# Patient Record
Sex: MALE | Race: WHITE | Employment: FULL TIME | URBAN - METROPOLITAN AREA
[De-identification: names, ages, dates, MRNs, and addresses within clinical notes are randomized per-mention and may not be internally consistent; named-entity substitution may affect disease eponyms.]

---

## 2017-10-13 ENCOUNTER — ALLIED HEALTH/NURSE VISIT (OUTPATIENT)
Dept: FAMILY MEDICINE | Facility: CLINIC | Age: 42
End: 2017-10-13
Payer: COMMERCIAL

## 2017-10-13 DIAGNOSIS — Z23 NEED FOR PROPHYLACTIC VACCINATION AND INOCULATION AGAINST INFLUENZA: Primary | ICD-10-CM

## 2017-10-13 PROCEDURE — 90471 IMMUNIZATION ADMIN: CPT

## 2017-10-13 PROCEDURE — 90686 IIV4 VACC NO PRSV 0.5 ML IM: CPT

## 2017-10-13 PROCEDURE — 99207 ZZC NO CHARGE NURSE ONLY: CPT

## 2017-10-13 NOTE — MR AVS SNAPSHOT
"              After Visit Summary   10/13/2017    Torey Mata    MRN: [id number]           Patient Information     Date Of Birth          1975        Visit Information        Provider Department      10/13/2017 3:45 PM FL YOLANDA MERCADO/LPN [facility]        Today's Diagnoses     Need for prophylactic vaccination and inoculation against influenza    -  1       Follow-ups after your visit        Who to contact     If you have questions or need follow up information about today's clinic visit or your schedule please contact [facility] directly at [phone].  Normal or non-critical lab and imaging results will be communicated to you by Sungy Mobilehart, letter or phone within 4 business days after the clinic has received the results. If you do not hear from us within 7 days, please contact the clinic through BeThereRewardst or phone. If you have a critical or abnormal lab result, we will notify you by phone as soon as possible.  Submit refill requests through [email or web address] or call your pharmacy and they will forward the refill request to us. Please allow 3 business days for your refill to be completed.          Additional Information About Your Visit        MyChart Information     [email or web address] lets you send messages to your doctor, view your test results, renew your prescriptions, schedule appointments and more. To sign up, go to [email or web address] . Click on \"Log in\" on the left side of the screen, which will take you to the Welcome page. Then click on \"Sign up Now\" on the right side of the page.     You will be asked to enter the access code listed below, as well as some personal information. Please follow the directions to create your username and password.     Your access code is: [email or web address]  Expires: 2018  4:00 PM     Your access code will  in 90 days. If you need help or a new code, please call your [facility] or [phone].        Care EveryWhere ID     This is your Care " EveryWhere ID. This could be used by other organizations to access your [place] medical records  [id number]         Blood Pressure from Last 3 Encounters:   03/03/16 130/88   01/15/16 118/71   06/08/15 110/77    Weight from Last 3 Encounters:   01/15/16 218 lb (98.9 kg)   06/08/15 214 lb (97.1 kg)   08/16/11 216 lb (98 kg)              We Performed the Following     FLU VAC, SPLIT VIRUS IM > 3 YO (QUADRIVALENT) [41996]     Vaccine Administration, Initial [85470]     Vaccine Administration, Initial [04486]        Primary Care Provider Office Phone # Fax #    Jerry Dave Carr [phone] [address] [address]        Equal Access to Services     JOLANTA JEONG : [place], waaxda luqadaha, qaybta kaalmada nicole, guillermo dodson . So [facility] [phone].    ATENCIÓN: Si habla español, tiene a xiao disposición servicios gratuitos de asistencia lingüística. Farihaame al [phone].    We comply with applicable federal civil rights laws and [place] laws. We do not discriminate on the basis of race, color, national origin, age, disability, sex, sexual orientation, or gender identity.            Thank you!     Thank you for choosing [facility]  for your care. Our goal is always to provide you with excellent care. Hearing back from our patients is one way we can continue to improve our services. Please take a few minutes to complete the written survey that you may receive in the mail after your visit with us. Thank you!             Your Updated Medication List - Protect others around you: Learn how to safely use, store and throw away your medicines at www.disposemymeds.org.          This list is accurate as of: 10/13/17  4:00 PM.  Always use your most recent med list.                   Brand Name Dispense Instructions for use Diagnosis    TYLENOL 500 MG tablet   Generic drug:  acetaminophen      Take 1-2 tablets by mouth  every 6 hours as needed.

## 2017-10-13 NOTE — PROGRESS NOTES
Injectable Influenza Immunization Documentation    1.  Is the person to be vaccinated sick today?   No    2. Does the person to be vaccinated have an allergy to a component   of the vaccine?   No    3. Has the person to be vaccinated ever had a serious reaction   to influenza vaccine in the past?   No    4. Has the person to be vaccinated ever had Guillain-Barré syndrome?   No    Form completed by Fariha Carrasquillo CMA  Prior to injection verified patient identity using patient's name and date of birth.  Per orders of  , injection of flu given by Fariha Carrasquillo. Patient instructed to remain in clinic for 15 minutes afterwards, and to report any adverse reaction to me immediately.

## 2018-09-17 ENCOUNTER — OFFICE VISIT (OUTPATIENT)
Dept: FAMILY MEDICINE | Facility: CLINIC | Age: 43
End: 2018-09-17
Payer: COMMERCIAL

## 2018-09-17 VITALS
TEMPERATURE: 97.6 F | BODY MASS INDEX: 29.03 KG/M2 | WEIGHT: 219 LBS | HEIGHT: 73 IN | HEART RATE: 69 BPM | SYSTOLIC BLOOD PRESSURE: 120 MMHG | DIASTOLIC BLOOD PRESSURE: 82 MMHG | OXYGEN SATURATION: 97 %

## 2018-09-17 DIAGNOSIS — Z23 NEED FOR PROPHYLACTIC VACCINATION AND INOCULATION AGAINST INFLUENZA: Primary | ICD-10-CM

## 2018-09-17 DIAGNOSIS — M51.26 LUMBAR DISC HERNIATION: ICD-10-CM

## 2018-09-17 PROCEDURE — 99213 OFFICE O/P EST LOW 20 MIN: CPT | Mod: 25 | Performed by: FAMILY MEDICINE

## 2018-09-17 PROCEDURE — 90686 IIV4 VACC NO PRSV 0.5 ML IM: CPT | Performed by: FAMILY MEDICINE

## 2018-09-17 PROCEDURE — 90471 IMMUNIZATION ADMIN: CPT | Performed by: FAMILY MEDICINE

## 2018-09-17 RX ORDER — KETOROLAC TROMETHAMINE 10 MG/1
10 TABLET, FILM COATED ORAL EVERY 6 HOURS PRN
Qty: 20 TABLET | Refills: 0 | Status: SHIPPED | OUTPATIENT
Start: 2018-09-17 | End: 2019-01-31

## 2018-09-17 NOTE — MR AVS SNAPSHOT
After Visit Summary   9/17/2018    Torey Mata    MRN: [id number]           Patient Information     Date Of Birth          1975        Visit Information        Provider Department      9/17/2018 11:20 AM Jerry Carr MD [facility]        Today's Diagnoses     Need for prophylactic vaccination and inoculation against influenza    -  1    Lumbar disc herniation          Care Instructions          Thank you for choosing [facility].  You may be receiving a survey in the mail from [facility] regarding your visit today.  Please take a few minutes to complete and return the survey to let us know how we are doing.      If you have questions or concerns, please contact us via Global Filmdemic or you can contact your care team at [phone].    Our Clinic hours are:  Monday 6:40 am  to 7:00 pm  Tuesday -Friday 6:40 am to 5:00 pm    The [place] outpatient lab hours are:  Monday - Friday 6:10 am to 4:45 pm  Saturdays 7:00 am to 11:00 am  Appointments are required, call [phone]    If you have clinical questions after hours or would like to schedule an appointment,  call the clinic at [phone].      (M51.26) Lumbar disc herniation  Comment:   Plan: PHYSICAL THERAPY REFERRAL        We discussed the issues and he will modify activities and avoid repetitive bending and twisting and lifting.   Use the Toradol at 10 mg every 6 hours as needed and avoid other NSAIDs such as advil and aleve and aspirin.   Tylenol is OK to use. Use ice on the middle of the spine and heat on any tight muscles. Use the therapies as discussed.   If not better then an MRI may be needed.     (Z23) Need for prophylactic vaccination and inoculation against influenza   Comment:   Plan: FLU VACCINE, SPLIT VIRUS, IM (QUADRIVALENT)         [82675]- >3 YRS, Vaccine Administration,         Initial [59701]        Done today.           Follow-ups after your visit        Additional Services     PHYSICAL THERAPY  "REFERRAL       *This therapy referral will be filtered to a centralized scheduling office at [facility] and the patient will receive a call to schedule an appointment at a [place] location most convenient for them. *     [facility] provides Physical Therapy evaluation and treatment and many specialty services across the [place] system.  If requesting a specialty program, please choose from the list below.    If you have not heard from the scheduling office within 2 business days, please call [phone] for all locations, with the exception of [place], please call [phone] and [facility], please call [phone]  Treatment: Evaluation & Treatment  Special Instructions/Modalities: use the modalities and the lumbar traction.   Special Programs:     Please be aware that coverage of these services is subject to the terms and limitations of your health insurance plan.  Call member services at your health plan with any benefit or coverage questions.      **Note to Provider:  If you are referring outside of [place] for the therapy appointment, please list the name of the location in the \"special instructions\" above, print the referral and give to the patient to schedule the appointment.                  Who to contact     If you have questions or need follow up information about today's clinic visit or your schedule please contact [facility] directly at [phone].  Normal or non-critical lab and imaging results will be communicated to you by MyChart, letter or phone within 4 business days after the clinic has received the results. If you do not hear from us within 7 days, please contact the clinic through MyChart or phone. If you have a critical or abnormal lab result, we will notify you by phone as soon as possible.  Submit refill requests through Panvidea or call your pharmacy and they will forward the refill request to us. Please allow 3 business days " "for your refill to be completed.          Additional Information About Your Visit        Thar Pharmaceuticalshart Information     Buzz360 gives you secure access to your electronic health record. If you see a primary care provider, you can also send messages to your care team and make appointments. If you have questions, please call your primary care clinic.  If you do not have a primary care provider, please call [phone] and they will assist you.        Care EveryWhere ID     This is your Care EveryWhere ID. This could be used by other organizations to access your [place] medical records  [id number]        Your Vitals Were     Pulse Temperature Height Pulse Oximetry BMI (Body Mass Index)       69 97.6  F (36.4  C) (Tympanic) 6' 1.25\" (1.861 m) 97% 28.7 kg/m2        Blood Pressure from Last 3 Encounters:   09/17/18 120/82   03/03/16 130/88   01/15/16 118/71    Weight from Last 3 Encounters:   09/17/18 219 lb (99.3 kg)   01/15/16 218 lb (98.9 kg)   06/08/15 214 lb (97.1 kg)              We Performed the Following     FLU VACCINE, SPLIT VIRUS, IM (QUADRIVALENT) [76583]- >3 YRS     PHYSICAL THERAPY REFERRAL     Vaccine Administration, Initial [98678]          Today's Medication Changes          These changes are accurate as of 9/17/18 12:07 PM.  If you have any questions, ask your nurse or doctor.               Start taking these medicines.        Dose/Directions    ketorolac 10 MG tablet   Commonly known as:  TORADOL   Used for:  Lumbar disc herniation   Started by:  Jerry Carr MD        Dose:  10 mg   Take 1 tablet (10 mg) by mouth every 6 hours as needed for moderate pain   Quantity:  20 tablet   Refills:  0            Where to get your medicines      These medications were sent to [id number] - [facility] [address]  [address] [address]    Hours:  Closed 10-16-08 business to [facility] Phone:  [phone]     ketorolac 10 MG tablet                Primary Care " Provider Office Phone # Fax #    Jerry Carr [phone] [address] [address]        Equal Access to Services     JOLANTA JEONG : Hadii aad ku hadizaiahgagan Cheyennewinnie, waaxda luqadaha, qaybta kaalmada nicole, guillermo huertakobe carusobrandyn tolentino west cisneros. So [facility] [phone].    ATENCIÓN: Si habla español, tiene a xiao disposición servicios gratuitos de asistencia lingüística. Llame al [phone].    We comply with applicable federal civil rights laws and [place] laws. We do not discriminate on the basis of race, color, national origin, age, disability, sex, sexual orientation, or gender identity.            Thank you!     Thank you for choosing [facility]  for your care. Our goal is always to provide you with excellent care. Hearing back from our patients is one way we can continue to improve our services. Please take a few minutes to complete the written survey that you may receive in the mail after your visit with us. Thank you!             Your Updated Medication List - Protect others around you: Learn how to safely use, store and throw away your medicines at www.disposemymeds.org.          This list is accurate as of 9/17/18 12:07 PM.  Always use your most recent med list.                   Brand Name Dispense Instructions for use Diagnosis    ketorolac 10 MG tablet    TORADOL    20 tablet    Take 1 tablet (10 mg) by mouth every 6 hours as needed for moderate pain    Lumbar disc herniation       TYLENOL 500 MG tablet   Generic drug:  acetaminophen      Take 1-2 tablets by mouth every 6 hours as needed.

## 2018-09-17 NOTE — PROGRESS NOTES
SUBJECTIVE:   Torey Mata is a 43 year old male who presents to clinic today for the following health issues:      Back Pain       Duration: Tender after Labor Day helping get a dock out of the water. Was better last week.  Woke up this morning with severe pain.        Specific cause: Possible over exertion.    Description:   Location of pain: low back bilaterally  Character of pain: sharp  Pain radiation:radiates into the right buttocks, radiates into the right leg, radiates into the left buttocks and radiates into the left leg  At times the pain can radiated down the the feet.  New numbness or weakness in legs, not attributed to pain:  no     Intensity: Currently 3/10, At its worst 8-9/10-very painful this morning.    History:   Pain interferes with job: YES, didn't feel well with driving today with his range of motion.  History of back problems: recurrent self limited episodes of low back pain in the past-seems to be about once per year where he will have an episode of back pain.  Any [facility]  Date [facility]  Sees a specialist for back pain:  No  Therapies tried without relief: Ibuprofen, TENS units, Ice, rest-usually will help his past history of back pain.  This therapy is not working this time.    Alleviating factors:   Improved by: Ibuprofen-took this morning.  This has helped with the pain somewhat today.    Precipitating factors:  Worsened by: Bending, sitting-uncomfortable.      ARMS:  Has noticed along with the back pain that his arms feel different.  Possible numbness.  Not full strength.        Current Outpatient Prescriptions:      acetaminophen (TYLENOL) 500 MG tablet, Take 1-2 tablets by mouth every 6 hours as needed., Disp: , Rfl:     Patient Active Problem List   Diagnosis     CARDIOVASCULAR SCREENING; LDL GOAL LESS THAN 160     Tinea versicolor       Blood pressure 120/82, pulse 69, temperature 97.6  F (36.4  C), temperature source Tympanic, height 6'  "1.25\" (1.861 m), weight 219 lb (99.3 kg), SpO2 97 %.    Exam:  GENERAL APPEARANCE: healthy, alert and no distress  NECK: no adenopathy, no asymmetry, masses, or scars and thyroid normal to palpation  MS: extremities normal- no gross deformities noted and decreased range of motion of the lumbar spine: only flex to 10 degrees.   There is increased tone of the lumbar paraspinal muscles.       (M51.26) Lumbar disc herniation  Comment:   Plan: PHYSICAL THERAPY REFERRAL        We discussed the issues and he will modify activities and avoid repetitive bending and twisting and lifting.   Use the Toradol at 10 mg every 6 hours as needed and avoid other NSAIDs such as advil and aleve and aspirin.   Tylenol is OK to use. Use ice on the middle of the spine and heat on any tight muscles. Use the therapies as discussed.   If not better then an MRI may be needed.     (Z23) Need for prophylactic vaccination and inoculation against influenza   Comment:   Plan: FLU VACCINE, SPLIT VIRUS, IM (QUADRIVALENT)         [19535]- >3 YRS, Vaccine Administration,         Initial [99703]        Done today.       Jerry Carr                     Injectable Influenza Immunization Documentation    1.  Is the person to be vaccinated sick today?   No    2. Does the person to be vaccinated have an allergy to a component   of the vaccine?   No  Egg Allergy Algorithm Link    3. Has the person to be vaccinated ever had a serious reaction   to influenza vaccine in the past?   No    4. Has the person to be vaccinated ever had Guillain-Barré syndrome?   No    Form completed by Blanca Terrazas CMA           "

## 2018-09-17 NOTE — PATIENT INSTRUCTIONS
Thank you for choosing [facility].  You may be receiving a survey in the mail from Radha Julien regarding your visit today.  Please take a few minutes to complete and return the survey to let us know how we are doing.      If you have questions or concerns, please contact us via Admify or you can contact your care team at [phone].    Our Clinic hours are:  Monday 6:40 am  to 7:00 pm  Tuesday -Friday 6:40 am to 5:00 pm    The [place] outpatient lab hours are:  Monday - Friday 6:10 am to 4:45 pm  Saturdays 7:00 am to 11:00 am  Appointments are required, call [phone]    If you have clinical questions after hours or would like to schedule an appointment,  call the clinic at [phone].      (M51.26) Lumbar disc herniation  Comment:   Plan: PHYSICAL THERAPY REFERRAL        We discussed the issues and he will modify activities and avoid repetitive bending and twisting and lifting.   Use the Toradol at 10 mg every 6 hours as needed and avoid other NSAIDs such as advil and aleve and aspirin.   Tylenol is OK to use. Use ice on the middle of the spine and heat on any tight muscles. Use the therapies as discussed.   If not better then an MRI may be needed.     (Z23) Need for prophylactic vaccination and inoculation against influenza   Comment:   Plan: FLU VACCINE, SPLIT VIRUS, IM (QUADRIVALENT)         [05833]- >3 YRS, Vaccine Administration,         Initial [68690]        Done today.

## 2019-01-17 ENCOUNTER — OFFICE VISIT (OUTPATIENT)
Dept: UROLOGY | Facility: CLINIC | Age: 44
End: 2019-01-17
Payer: COMMERCIAL

## 2019-01-17 VITALS — HEART RATE: 78 BPM | DIASTOLIC BLOOD PRESSURE: 85 MMHG | RESPIRATION RATE: 16 BRPM | SYSTOLIC BLOOD PRESSURE: 116 MMHG

## 2019-01-17 DIAGNOSIS — N43.2 OTHER HYDROCELE: Primary | ICD-10-CM

## 2019-01-17 PROCEDURE — 99214 OFFICE O/P EST MOD 30 MIN: CPT | Performed by: UROLOGY

## 2019-01-17 RX ORDER — CLINDAMYCIN PHOSPHATE 900 MG/50ML
900 INJECTION, SOLUTION INTRAVENOUS
Status: CANCELLED | OUTPATIENT
Start: 2019-01-17

## 2019-01-17 RX ORDER — CLINDAMYCIN PHOSPHATE 900 MG/50ML
900 INJECTION, SOLUTION INTRAVENOUS SEE ADMIN INSTRUCTIONS
Status: CANCELLED | OUTPATIENT
Start: 2019-01-17

## 2019-01-17 NOTE — NURSING NOTE
"Initial /85 (BP Location: Left arm, Patient Position: Chair, Cuff Size: Adult Regular)   Pulse 78   Resp 16  Estimated body mass index is 28.7 kg/m  as calculated from the following:    Height as of 9/17/18: 1.861 m (6' 1.25\").    Weight as of 9/17/18: 99.3 kg (219 lb). .    Patient is here for a recheck of hydrocele.  warren hughes LPN    "

## 2019-01-19 NOTE — PROGRESS NOTES
Visit Date:   01/17/2019      REASON FOR VISIT TODAY:  Hydrocele.      BRIEF COURSE:  Mr. Mata is a 43-year-old gentleman, followed in our clinic previously, last seen in 2016 for a right-sided hydrocele.  The patient was noted to have a fluid collection at the superior aspect of the testicle on the right consistent with a loculated hydrocele versus epididymal cyst.  The patient elected observation at that time.  He comes in today, noting that the mass has gotten larger and become more bothersome, awaking him at night and getting in the way when he is doing physical activity such as remodeling a cabin.  The patient comes in today seeking treatment for the hydrocele.      PHYSICAL EXAMINATION:   VITAL:  Blood pressure is 116/85, pulse 78.   GENERAL:  He is in no acute distress.   GENITOURINARY:  Examination of the right scrotal mass demonstrates a firm large mass consistent with the size of a bharati.  It is nontender.  The testicle is not clearly palpable.  The patient does have a scrotal ultrasound from 03/06/2016, demonstrating the fluid collection at that time superior to the right testicle.      ASSESSMENT AND PLAN:  Over half of today's 25-minute visit was spent counseling the patient regarding his hydrocele.  I suggested to Mr. Mata that unfortunately, the hydrocele has gotten bigger.  We think it is very reasonable since it is symptomatic to consider removal of the hydrocele.  We discussed options including aspiration versus surgical removal of the fluid-filled mass.  We did discuss that it is unclear whether it is a loculated hydrocele versus an epididymal head cyst but in either case, the surgical approach would be the same and the risks of pain, bleeding and infection, recurrence of the lesion, injury to the epididymis or injury to the testicle would be similar.  The patient asked many good questions today.  These were answered to his satisfaction.  The patient wishes to move forward with surgical removal of  the lesion, and we will see him in the operating room in the near future for resection of this cystic lesion.         MACK JACKSON MD             D: 2019   T: 2019   MT:       Name:     FLOR DOS SANTOS   MRN:      [id number]        Account:      [id number]   :      1975           Visit Date:   2019      Document: [id number]

## 2019-01-22 ENCOUNTER — TELEPHONE (OUTPATIENT)
Dept: UROLOGY | Facility: CLINIC | Age: 44
End: 2019-01-22

## 2019-01-22 DIAGNOSIS — N39.0 URINARY TRACT INFECTION: Primary | ICD-10-CM

## 2019-01-22 NOTE — TELEPHONE ENCOUNTER
Pre Op Teaching Flowsheet       Pre and Post op Patient Education  Relevant Diagnosis:  hydrocele  Teaching Topic:  Pre and post op teaching  Person Involved in teaching:  pt    Motivation Level:  Asks Questions: Yes  Eager to Learn:  Yes  Cooperative: Yes  Receptive (willing/able to accept information):  Yes  Patient demonstrates understanding of the following:  Date and time of surgery:  Feb 7 2019@1030  [facility] of surgery[facility]  [facility]  History and Physical and any other testing necessary prior to surgery: Yes  Required time line for completion of History and Physical and any pre-op testing: Yes    NPO Guidelines: NPO after midnight    Patient demonstrates understanding of the following:  Pre-op bowel prep:  N/A  Pre-op showering/scrub information with PCMX Soap: Yes  Medications to take the day of surgery:  Per PCP  Blood thinner medications discussed and when to stop (if applicable):  Yes  Diabetes medication management (if applicable):  N/A  Discussed pain control after surgery: pain scale  Infection Prevention: Patient demonstrates understanding of the following:  Patient instructed on hand hygiene:  Yes  Surgical procedure site care taught: Yes  Signs and symptoms of infection taught:  Yes  Wound care will be taught at the time of discharge.  Central venous catheter care will be taught at the time of discharge (if applicable).    Post-op follow-up:  Discussed how to contact the hospital, nurse, and clinic scheduling staff if necessary.    Instructional materials used/given/mailed:  [facility] Booklet, post op teaching sheet, Map, Soap, and arrival/location information.    Surgical instructions mailed to patient Yes. I spoke to patient on the phone..  warren hughes LPN

## 2019-01-31 ENCOUNTER — ANESTHESIA EVENT (OUTPATIENT)
Dept: SURGERY | Facility: CLINIC | Age: 44
End: 2019-01-31
Payer: COMMERCIAL

## 2019-01-31 ENCOUNTER — OFFICE VISIT (OUTPATIENT)
Dept: FAMILY MEDICINE | Facility: CLINIC | Age: 44
End: 2019-01-31
Payer: COMMERCIAL

## 2019-01-31 VITALS
WEIGHT: 233 LBS | DIASTOLIC BLOOD PRESSURE: 62 MMHG | HEART RATE: 86 BPM | BODY MASS INDEX: 30.88 KG/M2 | OXYGEN SATURATION: 97 % | TEMPERATURE: 96.1 F | HEIGHT: 73 IN | SYSTOLIC BLOOD PRESSURE: 118 MMHG | RESPIRATION RATE: 16 BRPM

## 2019-01-31 DIAGNOSIS — Z01.818 PREOP GENERAL PHYSICAL EXAM: Primary | ICD-10-CM

## 2019-01-31 LAB
BASOPHILS # BLD AUTO: 0 10E9/L (ref 0–0.2)
BASOPHILS NFR BLD AUTO: 0.5 %
CREAT SERPL-MCNC: 0.84 MG/DL (ref 0.66–1.25)
DIFFERENTIAL METHOD BLD: NORMAL
EOSINOPHIL # BLD AUTO: 0.2 10E9/L (ref 0–0.7)
EOSINOPHIL NFR BLD AUTO: 2.3 %
ERYTHROCYTE [DISTWIDTH] IN BLOOD BY AUTOMATED COUNT: 12.3 % (ref 10–15)
GFR SERPL CREATININE-BSD FRML MDRD: >90 ML/MIN/{1.73_M2}
HCT VFR BLD AUTO: 43.6 % (ref 40–53)
HGB BLD-MCNC: 15.6 G/DL (ref 13.3–17.7)
LYMPHOCYTES # BLD AUTO: 2.5 10E9/L (ref 0.8–5.3)
LYMPHOCYTES NFR BLD AUTO: 33.6 %
MCH RBC QN AUTO: 30.8 PG (ref 26.5–33)
MCHC RBC AUTO-ENTMCNC: 35.8 G/DL (ref 31.5–36.5)
MCV RBC AUTO: 86 FL (ref 78–100)
MONOCYTES # BLD AUTO: 0.5 10E9/L (ref 0–1.3)
MONOCYTES NFR BLD AUTO: 7.4 %
NEUTROPHILS # BLD AUTO: 4.1 10E9/L (ref 1.6–8.3)
NEUTROPHILS NFR BLD AUTO: 56.2 %
PLATELET # BLD AUTO: 198 10E9/L (ref 150–450)
RBC # BLD AUTO: 5.07 10E12/L (ref 4.4–5.9)
WBC # BLD AUTO: 7.3 10E9/L (ref 4–11)

## 2019-01-31 PROCEDURE — 36415 COLL VENOUS BLD VENIPUNCTURE: CPT | Performed by: FAMILY MEDICINE

## 2019-01-31 PROCEDURE — 99214 OFFICE O/P EST MOD 30 MIN: CPT | Performed by: FAMILY MEDICINE

## 2019-01-31 PROCEDURE — 87086 URINE CULTURE/COLONY COUNT: CPT | Performed by: UROLOGY

## 2019-01-31 PROCEDURE — 85025 COMPLETE CBC W/AUTO DIFF WBC: CPT | Performed by: FAMILY MEDICINE

## 2019-01-31 PROCEDURE — 82565 ASSAY OF CREATININE: CPT | Performed by: FAMILY MEDICINE

## 2019-01-31 ASSESSMENT — PAIN SCALES - GENERAL: PAINLEVEL: NO PAIN (0)

## 2019-01-31 ASSESSMENT — MIFFLIN-ST. JEOR: SCORE: 2014.72

## 2019-01-31 NOTE — PATIENT INSTRUCTIONS
Before Your Surgery      Call your surgeon if there is any change in your health. This includes signs of a cold or flu (such as a sore throat, runny nose, cough, rash or fever).    Do not smoke, drink alcohol or take over the counter medicine (unless your surgeon or primary care doctor tells you to) for the 24 hours before and after surgery.    If you take prescribed drugs: Follow your doctor s orders about which medicines to take and which to stop until after surgery.    Eating and drinking prior to surgery: follow the instructions from your surgeon    Take a shower or bath the night before surgery. Use the soap your surgeon gave you to gently clean your skin. If you do not have soap from your surgeon, use your regular soap. Do not shave or scrub the surgery site.  Wear clean pajamas and have clean sheets on your bed.     DIAGNOSTICS:     EKG: Not indicated due to non-vascular surgery and low risk of event (age <65 and without cardiac risk factors)  Labs Drawn and in Process:   Unresulted Labs Ordered in the Past 30 Days of this Admission     Date and Time Order Name Status Description    2019 1351 URINE CULTURE AEROBIC BACTERIAL In process           No results for input(s): HGB, PLT, INR, NA, POTASSIUM, CR, A1C in the last 71732 hours.     IMPRESSION:   Reason for surgery/procedure: Torey Mata (: 1975) presents for pre-operative evaluation assessment as requested by Dr. Griffin.  He requires evaluation and anesthesia risk assessment prior to undergoing surgery/procedure for treatment of HYDROCELECTOMY SCROTAL -right groin. Current pain score 0/10. Proposed Surgery/ Procedure: HYDROCELECTOMY SCROTAL -Right side.     The proposed surgical procedure is considered LOW risk.    REVISED CARDIAC RISK INDEX  The patient has the following serious cardiovascular risks for perioperative complications such as (MI, PE, VFib and 3  AV Block):  No serious cardiac risks  INTERPRETATION: 0 risks: Class I (very  low risk - 0.4% complication rate)    The patient has the following additional risks for perioperative complications:  No identified additional risks      ICD-10-CM    1. Preop general physical exam Z01.818    2. Urinary tract infection N39.0 Urine Culture Aerobic Bacterial       RECOMMENDATIONS:     --Patient is to take all scheduled medications on the day before surgery EXCEPT for modifications listed below.  Take no aspirin or advil or aleve now before surgery. Tylenol is OK.   Your stomach should be empty for 8 hours before surgery.     APPROVAL GIVEN to proceed with proposed procedure, without further diagnostic evaluation

## 2019-01-31 NOTE — H&P (VIEW-ONLY)
[facility]  [address] [address]  [phone]  Dept: [phone]    PRE-OP EVALUATION:  Today's date: 2019    Torey Mata (: 1975) presents for pre-operative evaluation assessment as requested by Dr. Griffin.  He requires evaluation and anesthesia risk assessment prior to undergoing surgery/procedure for treatment of HYDROCELECTOMY SCROTAL -right groin. Current pain score 0/10. Proposed Surgery/ Procedure: HYDROCELECTOMY SCROTAL -Right side.       Date of Surgery/ Procedure: 19  Time of Surgery/ Procedure: 11:30am  Hospital/Surgical Facility: [facility]  Primary Physician: Jerry Carr  Type of Anesthesia Anticipated: General    Patient has a Health Care Directive or Living Will:  NO    1. NO - Do you have a history of heart attack, stroke, stent, bypass or surgery on an artery in the head, neck, heart or legs?  2. NO - Do you ever have any pain or discomfort in your chest?  3. NO - Do you have a history of  Heart Failure?  4. NO - Are you troubled by shortness of breath when: walking on the level, up a slight hill or at night?  5. NO - Do you currently have a cold, bronchitis or other respiratory infection?  6. NO - Do you have a cough, shortness of breath or wheezing?  7. NO - Do you sometimes get pains in the calves of your legs when you walk?  8. NO - Do you or anyone in your family have previous history of blood clots?  9. NO - Do you or does anyone in your family have a serious bleeding problem such as prolonged bleeding following surgeries or cuts?  10. NO - Have you ever had problems with anemia or been told to take iron pills?  11. NO - Have you had any abnormal blood loss such as black, tarry or bloody stools, or abnormal vaginal bleeding?  12. NO - Have you ever had a blood transfusion?  13. NO - Have you or any of your relatives ever had problems with anesthesia?  14. NO - Do you have sleep apnea, excessive snoring or  "daytime drowsiness?  15. NO - Do you have any prosthetic heart valves?  16. NO - Do you have prosthetic joints?  17. NO - Is there any chance that you may be pregnant?      HPI:     HPI related to upcoming procedure: see above.       See problem list for active medical problems.  Problems all longstanding and stable, except as noted/documented.  See ROS for pertinent symptoms related to these conditions.                                                                                                                                                          .    MEDICAL HISTORY:     Patient Active Problem List    Diagnosis Date Noted     Tinea versicolor 06/08/2015     Priority: Medium     CARDIOVASCULAR SCREENING; LDL GOAL LESS THAN 160 10/31/2010     Priority: Medium      History reviewed. No pertinent past medical history.  History reviewed. No pertinent surgical history.  Current Outpatient Medications   Medication Sig Dispense Refill     acetaminophen (TYLENOL) 500 MG tablet Take 1-2 tablets by mouth every 6 hours as needed.       OTC products: None, except as noted above    Allergies   Allergen Reactions     Amoxicillin Rash      Latex Allergy: NO    Social History     Tobacco Use     Smoking status: Never Smoker     Smokeless tobacco: Never Used   Substance Use Topics     Alcohol use: No     History   Drug Use No       REVIEW OF SYSTEMS:   CONSTITUTIONAL: NEGATIVE for fever, chills, change in weight  ENT/MOUTH: NEGATIVE for ear, mouth and throat problems  RESP: NEGATIVE for significant cough or SOB  CV: NEGATIVE for chest pain, palpitations or peripheral edema    EXAM:   /62 (BP Location: Left arm, Patient Position: Chair, Cuff Size: Adult Large)   Pulse 86   Temp 96.1  F (35.6  C) (Tympanic)   Resp 16   Ht 1.861 m (6' 1.25\")   Wt 105.7 kg (233 lb)   SpO2 97%   BMI 30.53 kg/m    Exam:  GENERAL APPEARANCE: healthy, alert and no distress  EYES: EOMI,  PERRL  HENT: ear canals and TM's normal and nose " and mouth without ulcers or lesions  NECK: no adenopathy, no asymmetry, masses, or scars and thyroid normal to palpation  RESP: lungs clear to auscultation - no rales, rhonchi or wheezes  CV: regular rates and rhythm, normal S1 S2, no S3 or S4 and no murmur, click or rub -  ABDOMEN:  soft, nontender, no HSM or masses and bowel sounds normal  MS: extremities normal- no gross deformities noted, no evidence of inflammation in joints, FROM in all extremities.  SKIN: no suspicious lesions or rashes  NEURO: Normal strength and tone, sensory exam grossly normal, mentation intact and speech normal  PSYCH: mentation appears normal and affect normal/bright  LYMPHATICS: No axillary, cervical, inguinal, or supraclavicular nodes      DIAGNOSTICS:     EKG: Not indicated due to non-vascular surgery and low risk of event (age <65 and without cardiac risk factors)  Labs Drawn and in Process:   Unresulted Labs Ordered in the Past 30 Days of this Admission     Date and Time Order Name Status Description    2019 1351 URINE CULTURE AEROBIC BACTERIAL In process           No results for input(s): HGB, PLT, INR, NA, POTASSIUM, CR, A1C in the last 24395 hours.     IMPRESSION:   Reason for surgery/procedure: Torey Mata (: 1975) presents for pre-operative evaluation assessment as requested by Dr. Griffin.  He requires evaluation and anesthesia risk assessment prior to undergoing surgery/procedure for treatment of HYDROCELECTOMY SCROTAL -right groin. Current pain score 0/10. Proposed Surgery/ Procedure: HYDROCELECTOMY SCROTAL -Right side.     The proposed surgical procedure is considered LOW risk.    REVISED CARDIAC RISK INDEX  The patient has the following serious cardiovascular risks for perioperative complications such as (MI, PE, VFib and 3  AV Block):  No serious cardiac risks  INTERPRETATION: 0 risks: Class I (very low risk - 0.4% complication rate)    The patient has the following additional risks for perioperative  complications:  No identified additional risks      ICD-10-CM    1. Preop general physical exam Z01.818    2. Urinary tract infection N39.0 Urine Culture Aerobic Bacterial       RECOMMENDATIONS:     --Patient is to take all scheduled medications on the day before surgery EXCEPT for modifications listed below.  Take no aspirin or advil or aleve now before surgery. Tylenol is OK.   Your stomach should be empty for 8 hours before surgery.     APPROVAL GIVEN to proceed with proposed procedure, without further diagnostic evaluation       Signed Electronically by: Jerry Carr MD    Copy of this evaluation report is provided to requesting physician.    [facility] Preop Guidelines    Revised Cardiac Risk Index   Right arm;

## 2019-01-31 NOTE — NURSING NOTE
"Chief Complaint   Patient presents with     Pre-Op Exam     HYDROCELECTOMY SCROTAL       Initial /62 (BP Location: Left arm, Patient Position: Chair, Cuff Size: Adult Large)   Pulse 86   Temp 96.1  F (35.6  C) (Tympanic)   Resp 16   Ht 1.861 m (6' 1.25\")   Wt 105.7 kg (233 lb)   SpO2 97%   BMI 30.53 kg/m   Estimated body mass index is 30.53 kg/m  as calculated from the following:    Height as of this encounter: 1.861 m (6' 1.25\").    Weight as of this encounter: 105.7 kg (233 lb).    Medication Reconciliation: complete  Mitzi Ibanez MA  "

## 2019-01-31 NOTE — PROGRESS NOTES
[facility]  [address] [address]  [phone]  Dept: [phone]    PRE-OP EVALUATION:  Today's date: 2019    Torey Mata (: 1975) presents for pre-operative evaluation assessment as requested by Dr. Griffin.  He requires evaluation and anesthesia risk assessment prior to undergoing surgery/procedure for treatment of HYDROCELECTOMY SCROTAL -right groin. Current pain score 0/10. Proposed Surgery/ Procedure: HYDROCELECTOMY SCROTAL -Right side.       Date of Surgery/ Procedure: 19  Time of Surgery/ Procedure: 11:30am  Hospital/Surgical Facility: [facility]  Primary Physician: Jerry Carr  Type of Anesthesia Anticipated: General    Patient has a Health Care Directive or Living Will:  NO    1. NO - Do you have a history of heart attack, stroke, stent, bypass or surgery on an artery in the head, neck, heart or legs?  2. NO - Do you ever have any pain or discomfort in your chest?  3. NO - Do you have a history of  Heart Failure?  4. NO - Are you troubled by shortness of breath when: walking on the level, up a slight hill or at night?  5. NO - Do you currently have a cold, bronchitis or other respiratory infection?  6. NO - Do you have a cough, shortness of breath or wheezing?  7. NO - Do you sometimes get pains in the calves of your legs when you walk?  8. NO - Do you or anyone in your family have previous history of blood clots?  9. NO - Do you or does anyone in your family have a serious bleeding problem such as prolonged bleeding following surgeries or cuts?  10. NO - Have you ever had problems with anemia or been told to take iron pills?  11. NO - Have you had any abnormal blood loss such as black, tarry or bloody stools, or abnormal vaginal bleeding?  12. NO - Have you ever had a blood transfusion?  13. NO - Have you or any of your relatives ever had problems with anesthesia?  14. NO - Do you have sleep apnea, excessive snoring or  "daytime drowsiness?  15. NO - Do you have any prosthetic heart valves?  16. NO - Do you have prosthetic joints?  17. NO - Is there any chance that you may be pregnant?      HPI:     HPI related to upcoming procedure: see above.       See problem list for active medical problems.  Problems all longstanding and stable, except as noted/documented.  See ROS for pertinent symptoms related to these conditions.                                                                                                                                                          .    MEDICAL HISTORY:     Patient Active Problem List    Diagnosis Date Noted     Tinea versicolor 06/08/2015     Priority: Medium     CARDIOVASCULAR SCREENING; LDL GOAL LESS THAN 160 10/31/2010     Priority: Medium      History reviewed. No pertinent past medical history.  History reviewed. No pertinent surgical history.  Current Outpatient Medications   Medication Sig Dispense Refill     acetaminophen (TYLENOL) 500 MG tablet Take 1-2 tablets by mouth every 6 hours as needed.       OTC products: None, except as noted above    Allergies   Allergen Reactions     Amoxicillin Rash      Latex Allergy: NO    Social History     Tobacco Use     Smoking status: Never Smoker     Smokeless tobacco: Never Used   Substance Use Topics     Alcohol use: No     History   Drug Use No       REVIEW OF SYSTEMS:   CONSTITUTIONAL: NEGATIVE for fever, chills, change in weight  ENT/MOUTH: NEGATIVE for ear, mouth and throat problems  RESP: NEGATIVE for significant cough or SOB  CV: NEGATIVE for chest pain, palpitations or peripheral edema    EXAM:   /62 (BP Location: Left arm, Patient Position: Chair, Cuff Size: Adult Large)   Pulse 86   Temp 96.1  F (35.6  C) (Tympanic)   Resp 16   Ht 1.861 m (6' 1.25\")   Wt 105.7 kg (233 lb)   SpO2 97%   BMI 30.53 kg/m    Exam:  GENERAL APPEARANCE: healthy, alert and no distress  EYES: EOMI,  PERRL  HENT: ear canals and TM's normal and nose " and mouth without ulcers or lesions  NECK: no adenopathy, no asymmetry, masses, or scars and thyroid normal to palpation  RESP: lungs clear to auscultation - no rales, rhonchi or wheezes  CV: regular rates and rhythm, normal S1 S2, no S3 or S4 and no murmur, click or rub -  ABDOMEN:  soft, nontender, no HSM or masses and bowel sounds normal  MS: extremities normal- no gross deformities noted, no evidence of inflammation in joints, FROM in all extremities.  SKIN: no suspicious lesions or rashes  NEURO: Normal strength and tone, sensory exam grossly normal, mentation intact and speech normal  PSYCH: mentation appears normal and affect normal/bright  LYMPHATICS: No axillary, cervical, inguinal, or supraclavicular nodes      DIAGNOSTICS:     EKG: Not indicated due to non-vascular surgery and low risk of event (age <65 and without cardiac risk factors)  Labs Drawn and in Process:   Unresulted Labs Ordered in the Past 30 Days of this Admission     Date and Time Order Name Status Description    2019 1351 URINE CULTURE AEROBIC BACTERIAL In process           No results for input(s): HGB, PLT, INR, NA, POTASSIUM, CR, A1C in the last 22870 hours.     IMPRESSION:   Reason for surgery/procedure: Torey Mata (: 1975) presents for pre-operative evaluation assessment as requested by Dr. Griffin.  He requires evaluation and anesthesia risk assessment prior to undergoing surgery/procedure for treatment of HYDROCELECTOMY SCROTAL -right groin. Current pain score 0/10. Proposed Surgery/ Procedure: HYDROCELECTOMY SCROTAL -Right side.     The proposed surgical procedure is considered LOW risk.    REVISED CARDIAC RISK INDEX  The patient has the following serious cardiovascular risks for perioperative complications such as (MI, PE, VFib and 3  AV Block):  No serious cardiac risks  INTERPRETATION: 0 risks: Class I (very low risk - 0.4% complication rate)    The patient has the following additional risks for perioperative  complications:  No identified additional risks      ICD-10-CM    1. Preop general physical exam Z01.818    2. Urinary tract infection N39.0 Urine Culture Aerobic Bacterial       RECOMMENDATIONS:     --Patient is to take all scheduled medications on the day before surgery EXCEPT for modifications listed below.  Take no aspirin or advil or aleve now before surgery. Tylenol is OK.   Your stomach should be empty for 8 hours before surgery.     APPROVAL GIVEN to proceed with proposed procedure, without further diagnostic evaluation       Signed Electronically by: Jerry Carr MD    Copy of this evaluation report is provided to requesting physician.    [facility] Preop Guidelines    Revised Cardiac Risk Index

## 2019-02-01 LAB
BACTERIA SPEC CULT: NO GROWTH
Lab: NORMAL
SPECIMEN SOURCE: NORMAL

## 2019-02-07 ENCOUNTER — HOSPITAL ENCOUNTER (OUTPATIENT)
Facility: CLINIC | Age: 44
Discharge: HOME | End: 2019-02-07
Attending: UROLOGY | Admitting: UROLOGY
Payer: COMMERCIAL

## 2019-02-07 ENCOUNTER — ANESTHESIA (OUTPATIENT)
Dept: SURGERY | Facility: CLINIC | Age: 44
End: 2019-02-07
Payer: COMMERCIAL

## 2019-02-07 VITALS
OXYGEN SATURATION: 96 % | RESPIRATION RATE: 12 BRPM | TEMPERATURE: 98.7 F | WEIGHT: 240 LBS | HEIGHT: 73 IN | SYSTOLIC BLOOD PRESSURE: 130 MMHG | DIASTOLIC BLOOD PRESSURE: 88 MMHG | BODY MASS INDEX: 31.81 KG/M2

## 2019-02-07 DIAGNOSIS — G89.18 POST-OP PAIN: Primary | ICD-10-CM

## 2019-02-07 PROCEDURE — 27210794 ZZH OR GENERAL SUPPLY STERILE: Performed by: UROLOGY

## 2019-02-07 PROCEDURE — 36000052 ZZH SURGERY LEVEL 2 EA 15 ADDTL MIN: Performed by: UROLOGY

## 2019-02-07 PROCEDURE — 40000306 ZZH STATISTIC PRE PROC ASSESS II: Performed by: UROLOGY

## 2019-02-07 PROCEDURE — 25000128 H RX IP 250 OP 636: Performed by: NURSE ANESTHETIST, CERTIFIED REGISTERED

## 2019-02-07 PROCEDURE — 37000009 ZZH ANESTHESIA TECHNICAL FEE, EACH ADDTL 15 MIN: Performed by: UROLOGY

## 2019-02-07 PROCEDURE — 25000125 ZZHC RX 250: Performed by: NURSE ANESTHETIST, CERTIFIED REGISTERED

## 2019-02-07 PROCEDURE — 88304 TISSUE EXAM BY PATHOLOGIST: CPT | Mod: 26 | Performed by: UROLOGY

## 2019-02-07 PROCEDURE — 88304 TISSUE EXAM BY PATHOLOGIST: CPT | Performed by: UROLOGY

## 2019-02-07 PROCEDURE — 25000128 H RX IP 250 OP 636: Performed by: UROLOGY

## 2019-02-07 PROCEDURE — 27110028 ZZH OR GENERAL SUPPLY NON-STERILE: Performed by: UROLOGY

## 2019-02-07 PROCEDURE — 54830 REMOVE EPIDIDYMIS LESION: CPT | Mod: RT | Performed by: UROLOGY

## 2019-02-07 PROCEDURE — 25000125 ZZHC RX 250: Performed by: UROLOGY

## 2019-02-07 PROCEDURE — 71000027 ZZH RECOVERY PHASE 2 EACH 15 MINS: Performed by: UROLOGY

## 2019-02-07 PROCEDURE — 36000050 ZZH SURGERY LEVEL 2 1ST 30 MIN: Performed by: UROLOGY

## 2019-02-07 PROCEDURE — 37000008 ZZH ANESTHESIA TECHNICAL FEE, 1ST 30 MIN: Performed by: UROLOGY

## 2019-02-07 PROCEDURE — 71000012 ZZH RECOVERY PHASE 1 LEVEL 1 FIRST HR: Performed by: UROLOGY

## 2019-02-07 RX ORDER — HYDROMORPHONE HYDROCHLORIDE 1 MG/ML
.3-.5 INJECTION, SOLUTION INTRAMUSCULAR; INTRAVENOUS; SUBCUTANEOUS EVERY 10 MIN PRN
Status: DISCONTINUED | OUTPATIENT
Start: 2019-02-07 | End: 2019-02-07 | Stop reason: HOSPADM

## 2019-02-07 RX ORDER — LIDOCAINE HYDROCHLORIDE 10 MG/ML
INJECTION, SOLUTION INFILTRATION; PERINEURAL PRN
Status: DISCONTINUED | OUTPATIENT
Start: 2019-02-07 | End: 2019-02-07

## 2019-02-07 RX ORDER — FENTANYL CITRATE 50 UG/ML
25-50 INJECTION, SOLUTION INTRAMUSCULAR; INTRAVENOUS
Status: DISCONTINUED | OUTPATIENT
Start: 2019-02-07 | End: 2019-02-07 | Stop reason: HOSPADM

## 2019-02-07 RX ORDER — KETOROLAC TROMETHAMINE 30 MG/ML
30 INJECTION, SOLUTION INTRAMUSCULAR; INTRAVENOUS EVERY 6 HOURS PRN
Status: DISCONTINUED | OUTPATIENT
Start: 2019-02-07 | End: 2019-02-07 | Stop reason: HOSPADM

## 2019-02-07 RX ORDER — ONDANSETRON 4 MG/1
4 TABLET, ORALLY DISINTEGRATING ORAL EVERY 30 MIN PRN
Status: DISCONTINUED | OUTPATIENT
Start: 2019-02-07 | End: 2019-02-07 | Stop reason: HOSPADM

## 2019-02-07 RX ORDER — LIDOCAINE 40 MG/G
CREAM TOPICAL
Status: DISCONTINUED | OUTPATIENT
Start: 2019-02-07 | End: 2019-02-07 | Stop reason: HOSPADM

## 2019-02-07 RX ORDER — FENTANYL CITRATE 50 UG/ML
INJECTION, SOLUTION INTRAMUSCULAR; INTRAVENOUS PRN
Status: DISCONTINUED | OUTPATIENT
Start: 2019-02-07 | End: 2019-02-07

## 2019-02-07 RX ORDER — NALOXONE HYDROCHLORIDE 0.4 MG/ML
.1-.4 INJECTION, SOLUTION INTRAMUSCULAR; INTRAVENOUS; SUBCUTANEOUS
Status: DISCONTINUED | OUTPATIENT
Start: 2019-02-07 | End: 2019-02-07 | Stop reason: HOSPADM

## 2019-02-07 RX ORDER — CLINDAMYCIN PHOSPHATE 900 MG/50ML
900 INJECTION, SOLUTION INTRAVENOUS SEE ADMIN INSTRUCTIONS
Status: DISCONTINUED | OUTPATIENT
Start: 2019-02-07 | End: 2019-02-07 | Stop reason: HOSPADM

## 2019-02-07 RX ORDER — BUPIVACAINE HYDROCHLORIDE 2.5 MG/ML
INJECTION, SOLUTION INFILTRATION; PERINEURAL PRN
Status: DISCONTINUED | OUTPATIENT
Start: 2019-02-07 | End: 2019-02-07 | Stop reason: HOSPADM

## 2019-02-07 RX ORDER — PROPOFOL 10 MG/ML
INJECTION, EMULSION INTRAVENOUS PRN
Status: DISCONTINUED | OUTPATIENT
Start: 2019-02-07 | End: 2019-02-07

## 2019-02-07 RX ORDER — OXYCODONE HYDROCHLORIDE 5 MG/1
5 TABLET ORAL EVERY 6 HOURS PRN
Qty: 5 TABLET | Refills: 0 | Status: SHIPPED | OUTPATIENT
Start: 2019-02-07 | End: 2019-02-10

## 2019-02-07 RX ORDER — FENTANYL CITRATE 50 UG/ML
25-50 INJECTION, SOLUTION INTRAMUSCULAR; INTRAVENOUS
Status: CANCELLED | OUTPATIENT
Start: 2019-02-07

## 2019-02-07 RX ORDER — MEPERIDINE HYDROCHLORIDE 25 MG/ML
12.5 INJECTION INTRAMUSCULAR; INTRAVENOUS; SUBCUTANEOUS
Status: DISCONTINUED | OUTPATIENT
Start: 2019-02-07 | End: 2019-02-07 | Stop reason: HOSPADM

## 2019-02-07 RX ORDER — CLINDAMYCIN PHOSPHATE 900 MG/50ML
900 INJECTION, SOLUTION INTRAVENOUS
Status: COMPLETED | OUTPATIENT
Start: 2019-02-07 | End: 2019-02-07

## 2019-02-07 RX ORDER — SODIUM CHLORIDE, SODIUM LACTATE, POTASSIUM CHLORIDE, CALCIUM CHLORIDE 600; 310; 30; 20 MG/100ML; MG/100ML; MG/100ML; MG/100ML
INJECTION, SOLUTION INTRAVENOUS CONTINUOUS
Status: DISCONTINUED | OUTPATIENT
Start: 2019-02-07 | End: 2019-02-07 | Stop reason: HOSPADM

## 2019-02-07 RX ORDER — ONDANSETRON 2 MG/ML
INJECTION INTRAMUSCULAR; INTRAVENOUS PRN
Status: DISCONTINUED | OUTPATIENT
Start: 2019-02-07 | End: 2019-02-07

## 2019-02-07 RX ORDER — DEXAMETHASONE SODIUM PHOSPHATE 4 MG/ML
INJECTION, SOLUTION INTRA-ARTICULAR; INTRALESIONAL; INTRAMUSCULAR; INTRAVENOUS; SOFT TISSUE PRN
Status: DISCONTINUED | OUTPATIENT
Start: 2019-02-07 | End: 2019-02-07

## 2019-02-07 RX ORDER — ONDANSETRON 2 MG/ML
4 INJECTION INTRAMUSCULAR; INTRAVENOUS EVERY 30 MIN PRN
Status: DISCONTINUED | OUTPATIENT
Start: 2019-02-07 | End: 2019-02-07 | Stop reason: HOSPADM

## 2019-02-07 RX ORDER — ALBUTEROL SULFATE 0.83 MG/ML
2.5 SOLUTION RESPIRATORY (INHALATION) EVERY 4 HOURS PRN
Status: DISCONTINUED | OUTPATIENT
Start: 2019-02-07 | End: 2019-02-07 | Stop reason: HOSPADM

## 2019-02-07 RX ADMIN — MIDAZOLAM HYDROCHLORIDE 2 MG: 1 INJECTION, SOLUTION INTRAMUSCULAR; INTRAVENOUS at 12:07

## 2019-02-07 RX ADMIN — CLINDAMYCIN PHOSPHATE 900 MG: 18 INJECTION, SOLUTION INTRAVENOUS at 12:05

## 2019-02-07 RX ADMIN — MIDAZOLAM HYDROCHLORIDE 1 MG: 1 INJECTION, SOLUTION INTRAMUSCULAR; INTRAVENOUS at 12:12

## 2019-02-07 RX ADMIN — MIDAZOLAM HYDROCHLORIDE 2 MG: 1 INJECTION, SOLUTION INTRAMUSCULAR; INTRAVENOUS at 12:05

## 2019-02-07 RX ADMIN — SODIUM CHLORIDE, POTASSIUM CHLORIDE, SODIUM LACTATE AND CALCIUM CHLORIDE 1000 ML: 600; 310; 30; 20 INJECTION, SOLUTION INTRAVENOUS at 11:03

## 2019-02-07 RX ADMIN — FENTANYL CITRATE 50 MCG: 50 INJECTION, SOLUTION INTRAMUSCULAR; INTRAVENOUS at 12:05

## 2019-02-07 RX ADMIN — LIDOCAINE HYDROCHLORIDE 50 MG: 10 INJECTION, SOLUTION INFILTRATION; PERINEURAL at 12:09

## 2019-02-07 RX ADMIN — PROPOFOL 200 MG: 10 INJECTION, EMULSION INTRAVENOUS at 12:09

## 2019-02-07 RX ADMIN — FENTANYL CITRATE 50 MCG: 50 INJECTION, SOLUTION INTRAMUSCULAR; INTRAVENOUS at 12:07

## 2019-02-07 RX ADMIN — SODIUM CHLORIDE, POTASSIUM CHLORIDE, SODIUM LACTATE AND CALCIUM CHLORIDE: 600; 310; 30; 20 INJECTION, SOLUTION INTRAVENOUS at 14:00

## 2019-02-07 RX ADMIN — FENTANYL CITRATE 100 MCG: 50 INJECTION, SOLUTION INTRAMUSCULAR; INTRAVENOUS at 13:33

## 2019-02-07 RX ADMIN — DEXAMETHASONE SODIUM PHOSPHATE 4 MG: 4 INJECTION, SOLUTION INTRA-ARTICULAR; INTRALESIONAL; INTRAMUSCULAR; INTRAVENOUS; SOFT TISSUE at 12:15

## 2019-02-07 RX ADMIN — HYDROMORPHONE HYDROCHLORIDE 0.5 MG: 1 INJECTION, SOLUTION INTRAMUSCULAR; INTRAVENOUS; SUBCUTANEOUS at 15:02

## 2019-02-07 RX ADMIN — ONDANSETRON 4 MG: 2 INJECTION INTRAMUSCULAR; INTRAVENOUS at 12:15

## 2019-02-07 RX ADMIN — Medication 1 ML: at 11:03

## 2019-02-07 ASSESSMENT — MIFFLIN-ST. JEOR: SCORE: 2042.51

## 2019-02-07 NOTE — ANESTHESIA PREPROCEDURE EVALUATION
"Anesthesia Pre-Procedure Evaluation    Patient: Torey Mata   MRN: [id number] : 1975          Preoperative Diagnosis: hydrocele    Procedure(s):  HYDROCELECTOMY SCROTAL    No past medical history on file.  No past surgical history on file.    Anesthesia Evaluation     . Pt has had prior anesthetic. Type: General           ROS/MED HX    ENT/Pulmonary:  - neg pulmonary ROS     Neurologic:  - neg neurologic ROS     Cardiovascular:     (+) Dyslipidemia, ----. : . . . :. .       METS/Exercise Tolerance:     Hematologic:  - neg hematologic  ROS       Musculoskeletal:  - neg musculoskeletal ROS       GI/Hepatic:  - neg GI/hepatic ROS       Renal/Genitourinary:  - ROS Renal section negative       Endo:     (+) Obesity, .      Psychiatric:  - neg psychiatric ROS       Infectious Disease:  - neg infectious disease ROS       Malignancy:      - no malignancy   Other:    - neg other ROS                      Physical Exam  Normal systems: cardiovascular, pulmonary and dental    Airway   Mallampati: II  TM distance: >3 FB  Neck ROM: full    Dental     Cardiovascular       Pulmonary             Lab Results   Component Value Date    WBC 7.3 2019    HGB 15.6 2019    HCT 43.6 2019     2019    CR 0.84 2019       Preop Vitals  BP Readings from Last 3 Encounters:   19 121/81   19 118/62   19 116/85    Pulse Readings from Last 3 Encounters:   19 86   19 78   18 69      Resp Readings from Last 3 Encounters:   19 18   19 16   19 16    SpO2 Readings from Last 3 Encounters:   19 98%   19 97%   18 97%      Temp Readings from Last 1 Encounters:   19 36.8  C (98.2  F) (Oral)    Ht Readings from Last 1 Encounters:   19 1.854 m (6' 1\")      Wt Readings from Last 1 Encounters:   19 108.9 kg (240 lb)    Estimated body mass index is 31.66 kg/m  as calculated from the following:    Height as of this encounter: 1.854 " "m (6' 1\").    Weight as of this encounter: 108.9 kg (240 lb).       Anesthesia Plan      History & Physical Review  History and physical reviewed and following examination; no interval change.    ASA Status:  2 .    NPO Status:  > 6 hours    Plan for General and LMA with Intravenous induction. Maintenance will be Balanced.    PONV prophylaxis:  Ondansetron (or other 5HT-3) and Dexamethasone or Solumedrol       Postoperative Care  Postoperative pain management:  IV analgesics, Oral pain medications and Multi-modal analgesia.      Consents  Anesthetic plan, risks, benefits and alternatives discussed with:  Patient..                 TEOFILO Lerner CRNA  "

## 2019-02-07 NOTE — DISCHARGE INSTRUCTIONS
Same Day Surgery Discharge Instructions  Special Precautions After Surgery - Adult    1. It is not unusual to feel lightheaded or faint, up to 24 hours after surgery or while taking pain medication.  If you have these symptoms; sit for a few minutes before standing and have someone assist you when getting up.  2. You should rest and relax for the next 24 hours and must have someone stay with you for at least 24 hours after your discharge.  3. DO NOT DRIVE any vehicle or operate mechanical equipment for 24 hours following the end of your surgery.  DO NOT DRIVE while taking narcotic pain medications that have been prescribed by your physician.  If you had a limb operated on, you must be able to use it fully to drive.  4. DO NOT drink alcoholic beverages for 24 hours following surgery or while taking prescription pain medication.  5. Drink clear liquids (apple juice, ginger ale, broth, 7-Up, etc.).  Progress to your regular diet as you feel able.  6. Any questions call your physician and do not make important decisions for 24 hours.     INCISIONAL CARE  ? Be alert for signs of infection:  redness, swelling, heat, drainage of pus, and/or elevated temperature.  Contact your doctor if these occur.     __________________________________________________________________________________________________________________________________  IMPORTANT NUMBERS:    [facility] – [place], [place] Main Number:  [phone], [phone]  Pharmacy:  [phone]  Same Day Surgery:  [phone], Monday - Friday until 8:30 p.m.  Urgent Care:  [phone]  Emergency Room:  [phone]      [facility]:  [phone]           Dr. Griffin: [phone]                                                                          Break through Bleeding  As instructed per Surgeon or Nurse.  If you have any bleeding or drainage call your surgeon.    Post Op Infection  Be alert for signs of infection: redness, swelling, heat, drainage of pus,  and/or elevated temperature.  Contact your surgeon if these occur.    Nausea   If post op nausea occurs, at first rest your stomach for a few hours by eating nothing solid and sipping only clear liquids.  Call your Surgeon if nausea does not resolve in 24 hours.    Notify physician if fever/chills/sweats.  You may have swelling in the scrotum.  Notify physician if discharge or redness around wound.

## 2019-02-07 NOTE — ANESTHESIA CARE TRANSFER NOTE
Patient: Torey Mata    Procedure(s):  Excision of right epididymal cyst    Diagnosis: hydrocele  Diagnosis Additional Information: No value filed.    Anesthesia Type:   General, LMA     Note:  Airway :Nasal Cannula  Patient transferred to:PACU  Handoff Report: Identifed the Patient, Identified the Reponsible Provider, Reviewed the pertinent medical history, Discussed the surgical course, Reviewed Intra-OP anesthesia mangement and issues during anesthesia, Set expectations for post-procedure period and Allowed opportunity for questions and acknowledgement of understanding      Vitals: (Last set prior to Anesthesia Care Transfer)    CRNA VITALS  2/7/2019 1353 - 2/7/2019 1423      2/7/2019             Resp Rate (observed):  13                Electronically Signed By: Margarita Valente CRNA, APRN CRNA  February 7, 2019  2:23 PM

## 2019-02-07 NOTE — BRIEF OP NOTE
[facility] Brief Operative Note    Pre-operative diagnosis: epidydimal head cyst   Post-operative diagnosis Same     Procedure: Procedure(s):  Excision of right epididymal cyst   Surgeon(s): Surgeon(s) and Role:     * Jake Griffin MD - Primary     * Jeffrey Phillips PA-C - Assisting   Estimated blood loss: * No values recorded between 2/7/2019 12:33 PM and 2/7/2019  2:16 PM *    Specimens: ID Type Source Tests Collected by Time Destination   A : right epididymal cyst sac Tissue Testicle, Left SURGICAL PATHOLOGY EXAM Jake Griffin MD 2/7/2019  1:10 PM       Findings: epidydimal head cyst

## 2019-02-07 NOTE — ANESTHESIA POSTPROCEDURE EVALUATION
Patient: Torey Mata    Procedure(s):  Excision of right epididymal cyst    Diagnosis:hydrocele  Diagnosis Additional Information: No value filed.    Anesthesia Type:  General, LMA    Note:  Anesthesia Post Evaluation    Patient location during evaluation: Phase 2  Patient participation: Able to fully participate in evaluation  Level of consciousness: awake and alert  Pain management: adequate  Airway patency: patent  Cardiovascular status: acceptable and hemodynamically stable  Respiratory status: acceptable and room air  Hydration status: acceptable  PONV: none     Anesthetic complications: None          Last vitals:  Vitals:    02/07/19 1441 02/07/19 1459 02/07/19 1520   BP: (!) 138/99 (!) 125/92 126/81   Resp: 12 14 12   Temp:   37.1  C (98.7  F)   SpO2: 96% 95% 96%         Electronically Signed By: TEOFILO Lerner CRNA  February 7, 2019  3:45 PM

## 2019-02-08 NOTE — OP NOTE
Procedure Date: 02/07/2019      PREOPERATIVE DIAGNOSIS:  Right epididymal head cyst.      POSTOPERATIVE DIAGNOSIS:  Right epididymal head cyst.      PROCEDURE:  Excision of right epididymal head cyst.         INDICATIONS:  Mr. Mata is a 43-year-old gentleman followed in our clinic for history of roughly 5-6 cm right epididymal head cyst.  After discussion of risks, benefits and alternatives, the patient presented today for excision of the epididymal head cyst.      DESCRIPTION OF PROCEDURE:  After informed consent was obtained, the patient was brought to the operating room where he was administered general anesthesia with an LMA.  After suitable level of anesthesia was obtained, placed in supine position with all pressure points padded.  He was administered preoperative antibiotics.  He was prepped and draped in standard sterile fashion.  Next, a transverse incision was made across the right hemiscrotum with a 15 blade.  The dartos tissues were divided with the knife down to the level of the epididymal head cyst sac.  We then finger dissected in this plane and freed up the testicle from the dartos tissues.  This testicle was then delivered through the scrotal incision.  We then opened up the cyst sac and clear straw-colored fluid was removed.  We then dissected the epididymal head sac down to a stalk and the top of the epididymis and this was tied off with a 4-0 Vicryl tie.  It was then amputated and sent to pathology.  We then inverted the tunica vaginalis around the testicle and sewed it to itself with a running 3-0 Vicryl suture.  We then obtained meticulous hemostasis of the testicle and the dartos tissues.  We then confirmed the correct orientation of the testicle and it was returned back into the scrotum.  The dartos was then closed with a running stitch of 3-0 Vicryl and the skin closed with running stitch of 3-0 Vicryl as well.  Marcaine 0.25% was used for local anesthesia.  The wound was dressed with skin  glue, fluff dressings and a jock strap.  The patient was then awakened.  LMA removed.  He was brought to the recovery room in stable condition.      SURGEON:  Jake Griffin MD      ASSISTANT:  Jeffrey Phillips PA-C      ESTIMATED BLOOD LOSS:  5 mL.        COMPLICATIONS:  There were no immediate complications noted.      SPECIMEN:  Includes right epididymal head cyst.         JAKE GRIFFIN MD             D: 2019   T: 2019   MT: CORINE      Name:     FLOR DOS SANTOS   MRN:      [id number]        Account:        [id number]   :      1975           Procedure Date: 2019      Document: [id number]

## 2019-02-11 LAB — COPATH REPORT: NORMAL

## 2019-05-21 ENCOUNTER — TELEPHONE (OUTPATIENT)
Dept: UROLOGY | Facility: CLINIC | Age: 44
End: 2019-05-21

## 2019-05-21 NOTE — TELEPHONE ENCOUNTER
Reason for Call:  Other appointment    Detailed comments: pt calling stating he cannot make appt on 5/23. Would like to know when he can get in next. Post op visit. He will be out of town that day for work     Phone Number Patient can be reached at: Cell number on file:    Telephone Information:   Mobile [phone]       Best Time: any     Can we leave a detailed message on this number? YES    Call taken on 5/21/2019 at 12:14 PM by Masha Georges

## 2019-05-22 NOTE — TELEPHONE ENCOUNTER
Called patient and left VM, informed that we could possibly schedule him for the morning of 6/6 if Dr Griffin does not getting any surgery cases for that day. Informed that we would have a better idea once we talk to Dr Griffin tomorrow when he is back in clinic.     Informed patient that he could call us back with a specific date or if he has questions. Otherwise we will call him back tomorrow when we have a better idea as to when we can schedule him.    Tracy LINARES MA

## 2019-05-24 NOTE — TELEPHONE ENCOUNTER
Tried to call patient to get them scheduled for June 6th with Dr. Griffin, but it sound like I was hung up on both times and was unable to speak to anyone or leave a message. Will try at another time.    Nash ROBERTS CMA ([facility])

## 2019-10-11 ENCOUNTER — ALLIED HEALTH/NURSE VISIT (OUTPATIENT)
Dept: FAMILY MEDICINE | Facility: CLINIC | Age: 44
End: 2019-10-11
Payer: COMMERCIAL

## 2019-10-11 DIAGNOSIS — Z23 NEED FOR PROPHYLACTIC VACCINATION AND INOCULATION AGAINST INFLUENZA: Primary | ICD-10-CM

## 2019-10-11 PROCEDURE — 90686 IIV4 VACC NO PRSV 0.5 ML IM: CPT

## 2019-10-11 PROCEDURE — 99207 ZZC NO CHARGE NURSE ONLY: CPT

## 2019-10-11 PROCEDURE — 90471 IMMUNIZATION ADMIN: CPT

## 2020-02-16 ENCOUNTER — HEALTH MAINTENANCE LETTER (OUTPATIENT)
Age: 45
End: 2020-02-16

## 2020-10-07 ENCOUNTER — OFFICE VISIT (OUTPATIENT)
Dept: FAMILY MEDICINE | Facility: CLINIC | Age: 45
End: 2020-10-07
Payer: COMMERCIAL

## 2020-10-07 VITALS
HEIGHT: 73 IN | DIASTOLIC BLOOD PRESSURE: 78 MMHG | OXYGEN SATURATION: 98 % | BODY MASS INDEX: 28.68 KG/M2 | TEMPERATURE: 96.2 F | SYSTOLIC BLOOD PRESSURE: 106 MMHG | HEART RATE: 75 BPM | WEIGHT: 216.4 LBS

## 2020-10-07 DIAGNOSIS — R73.03 PREDIABETES: ICD-10-CM

## 2020-10-07 DIAGNOSIS — Z23 NEED FOR VACCINATION: Primary | ICD-10-CM

## 2020-10-07 DIAGNOSIS — B36.0 TINEA VERSICOLOR: ICD-10-CM

## 2020-10-07 DIAGNOSIS — Z00.00 ROUTINE HISTORY AND PHYSICAL EXAMINATION OF ADULT: ICD-10-CM

## 2020-10-07 DIAGNOSIS — Z23 NEED FOR PROPHYLACTIC VACCINATION AND INOCULATION AGAINST INFLUENZA: ICD-10-CM

## 2020-10-07 LAB — GLUCOSE SERPL-MCNC: 118 MG/DL (ref 70–99)

## 2020-10-07 PROCEDURE — 99213 OFFICE O/P EST LOW 20 MIN: CPT | Mod: 25 | Performed by: FAMILY MEDICINE

## 2020-10-07 PROCEDURE — 90714 TD VACC NO PRESV 7 YRS+ IM: CPT | Performed by: FAMILY MEDICINE

## 2020-10-07 PROCEDURE — 99396 PREV VISIT EST AGE 40-64: CPT | Mod: 25 | Performed by: FAMILY MEDICINE

## 2020-10-07 PROCEDURE — 90686 IIV4 VACC NO PRSV 0.5 ML IM: CPT | Performed by: FAMILY MEDICINE

## 2020-10-07 PROCEDURE — 90472 IMMUNIZATION ADMIN EACH ADD: CPT | Performed by: FAMILY MEDICINE

## 2020-10-07 PROCEDURE — 82947 ASSAY GLUCOSE BLOOD QUANT: CPT | Performed by: FAMILY MEDICINE

## 2020-10-07 PROCEDURE — 90471 IMMUNIZATION ADMIN: CPT | Performed by: FAMILY MEDICINE

## 2020-10-07 RX ORDER — FLUCONAZOLE 150 MG/1
150 TABLET ORAL ONCE
Qty: 1 TABLET | Refills: 5 | Status: SHIPPED | OUTPATIENT
Start: 2020-10-07 | End: 2020-10-07

## 2020-10-07 ASSESSMENT — MIFFLIN-ST. JEOR: SCORE: 1929.42

## 2020-10-07 NOTE — PATIENT INSTRUCTIONS
"  ASSESSMENT/PLAN:     (Z00.00) Routine history and physical examination of adult  Comment:   Plan: Patient has been advised of split billing requirements and indicates understanding: Yes  COUNSELING:   Reviewed preventive health counseling, as reflected in patient instructions       Regular exercise       Healthy diet/nutrition       Vision screening       Hearing screening  Estimated body mass index is 28.36 kg/m  as calculated from the following:    Height as of this encounter: 1.861 m (6' 1.25\").    Weight as of this encounter: 98.2 kg (216 lb 6.4 oz).   He reports that he has never smoked. He has never used smokeless tobacco.  Counseling Resources:  ATP IV Guidelines  Pooled Cohorts Equation Calculator  FRAX Risk Assessment  ICSI Preventive Guidelines  Dietary Guidelines for Americans, 2010  [employer]'s MyPlate  ASA Prophylaxis  Lung CA Screening      (Z23) Need for vaccination  (primary encounter diagnosis)  Comment:   Plan: TD PRESERV FREE, IM (7+ YRS), EA ADD'L VACCINE        Done today    (Z23) Need for prophylactic vaccination and inoculation against influenza  Comment:   Plan: INFLUENZA VACCINE IM > 6 MONTHS VALENT IIV4         [26818], Vaccine Administration, Initial         [78620]        Done today    (R73.03) Prediabetes  Comment:   Plan: Glucose, Hemoglobin A1c        We discussed the outside labs. The A1c is 6.6%. we will order the future fasting glucose and call the lab at [phone] for this.   We will call the results. Use the lower carb diet and daily exercise and weight control. The future A1c for January is ordered. Follow up in clinic after that.     (B36.0) Tinea versicolor  Comment:   Plan: fluconazole (DIFLUCAN) 150 MG tablet        For the yeast on the right arm, use Diflucan at 150 mg for one dose. There are refills if needed.   "

## 2020-10-07 NOTE — NURSING NOTE
Prior to immunization administration, verified patients identity using patient s name and date of birth. Please see Immunization Activity for additional information.     Screening Questionnaire for Adult Immunization    Are you sick today?   No   Do you have allergies to medications, food, a vaccine component or latex?   No   Have you ever had a serious reaction after receiving a vaccination?   No   Do you have a long-term health problem with heart, lung, kidney, or metabolic disease (e.g., diabetes), asthma, a blood disorder, no spleen, complement component deficiency, a cochlear implant, or a spinal fluid leak?  Are you on long-term aspirin therapy?   No   Do you have cancer, leukemia, HIV/AIDS, or any other immune system problem?   No   Do you have a parent, brother, or sister with an immune system problem?   No   In the past 3 months, have you taken medications that affect  your immune system, such as prednisone, other steroids, or anticancer drugs; drugs for the treatment of rheumatoid arthritis, Crohn s disease, or psoriasis; or have you had radiation treatments?   No   Have you had a seizure, or a brain or other nervous system problem?   No   During the past year, have you received a transfusion of blood or blood    products, or been given immune (gamma) globulin or antiviral drug?   No   For women: Are you pregnant or is there a chance you could become       pregnant during the next month?   No   Have you received any vaccinations in the past 4 weeks?   No     Immunization questionnaire was positive for at least one answer.  Ok per guidelines for flu and td .         Patient instructed to remain in clinic for 15 minutes afterwards, and to report any adverse reaction to me immediately.       Screening performed by Elliot Bond CMA on 10/7/2020 at 8:28 AM.

## 2020-10-07 NOTE — PROGRESS NOTES
SUBJECTIVE:   CC: Torey Mata is an 45 year old male who presents for preventative health visit.   Chief Complaint   Patient presents with     Physical     Physical, fasting for labs      Imm/Inj     Td      Lab Result Notice     Patient had a physical for life insurance 9/2020 and labs were done Non fasting glucose:116, Hemoglobin A1c = 6.6, urine , protein was 19. Would like to discuss these.      Imm/Inj     Flu Shot         Patient has been advised of split billing requirements and indicates understanding: Yes  Healthy Habits:     Getting at least 3 servings of Calcium per day:  Yes    Bi-annual eye exam:  NO    Dental care twice a year:  Yes    Sleep apnea or symptoms of sleep apnea:  None    Diet:  Regular (no restrictions)    Frequency of exercise:  4-5 days/week    Duration of exercise:  30-45 minutes    Taking medications regularly:  Not Applicable    Barriers to taking medications:  Not applicable    Medication side effects:  Not applicable    PHQ-2 Total Score: 0    Additional concerns today:  Yes          Concern -     Lab Result Notice     Patient had a physical for life insurance 9/2020 and labs were done Non fasting glucose:116, Hemoglobin A1c = 6.6, urine , protein was 19. Would like to discuss these.            Today's PHQ-2 Score:   PHQ-2 ( 1999 Pfizer) 10/7/2020   Q1: Little interest or pleasure in doing things 0   Q2: Feeling down, depressed or hopeless 0   PHQ-2 Score 0       Abuse: Current or Past(Physical, Sexual or Emotional)- No  Do you feel safe in your environment? Yes        Social History     Tobacco Use     Smoking status: Never Smoker     Smokeless tobacco: Never Used   Substance Use Topics     Alcohol use: No     If you drink alcohol do you typically have >3 drinks per day or >7 drinks per week? No    Alcohol Use 10/7/2020   Prescreen: >3 drinks/day or >7 drinks/week? No       Last PSA: No results found for: PSA    Reviewed orders with patient. Reviewed health maintenance and  "updated orders accordingly - Yes      Reviewed and updated as needed this visit by clinical staff  Tobacco  Allergies  Meds   Med Hx  Surg Hx  Fam Hx  Soc Hx        Reviewed and updated as needed this visit by Provider                  OBJECTIVE:   /78 (BP Location: Left arm, Patient Position: Chair, Cuff Size: Adult Regular)   Pulse 75   Temp 96.2  F (35.7  C) (Tympanic)   Ht 1.861 m (6' 1.25\")   Wt 98.2 kg (216 lb 6.4 oz)   SpO2 98%   BMI 28.36 kg/m      Physical Exam  Exam:  GENERAL APPEARANCE: healthy, alert and no distress  EYES: EOMI,  PERRL  HENT: ear canals and TM's normal and nose and mouth without ulcers or lesions  NECK: no adenopathy, no asymmetry, masses, or scars and thyroid normal to palpation  RESP: lungs clear to auscultation - no rales, rhonchi or wheezes  CV: regular rates and rhythm, normal S1 S2, no S3 or S4 and no murmur, click or rub -  ABDOMEN:  soft, nontender, no HSM or masses and bowel sounds normal  GU_male: testicles normal without atrophy or masses, no hernias and penis normal without urethral discharge  MS: extremities normal- no gross deformities noted, no evidence of inflammation in joints, FROM in all extremities.  SKIN: tinea corporis is noted.   NEURO: Normal strength and tone, sensory exam grossly normal, mentation intact and speech normal  PSYCH: mentation appears normal and affect normal/bright  LYMPHATICS: No axillary, cervical, inguinal, or supraclavicular nodes      ASSESSMENT/PLAN:     (Z00.00) Routine history and physical examination of adult  Comment:   Plan: Patient has been advised of split billing requirements and indicates understanding: Yes  COUNSELING:   Reviewed preventive health counseling, as reflected in patient instructions       Regular exercise       Healthy diet/nutrition       Vision screening       Hearing screening  Estimated body mass index is 28.36 kg/m  as calculated from the following:    Height as of this encounter: 1.861 m (6' " "1.25\").    Weight as of this encounter: 98.2 kg (216 lb 6.4 oz).   He reports that he has never smoked. He has never used smokeless tobacco.  Counseling Resources:  ATP IV Guidelines  Pooled Cohorts Equation Calculator  FRAX Risk Assessment  ICSI Preventive Guidelines  Dietary Guidelines for Americans, 2010  USDA's MyPlate  ASA Prophylaxis  Lung CA Screening      (Z23) Need for vaccination  (primary encounter diagnosis)  Comment:   Plan: TD PRESERV FREE, IM (7+ YRS), EA ADD'L VACCINE        Done today    (Z23) Need for prophylactic vaccination and inoculation against influenza  Comment:   Plan: INFLUENZA VACCINE IM > 6 MONTHS VALENT IIV4         [16126], Vaccine Administration, Initial         [74978]        Done today    (R73.03) Prediabetes  Comment:   Plan: Glucose, Hemoglobin A1c        We discussed the outside labs. The A1c is 6.6%. we will order the future fasting glucose and call the lab at [phone] for this.   We will call the results. Use the lower carb diet and daily exercise and weight control. The future A1c for January is ordered. Follow up in clinic after that.     (B36.0) Tinea versicolor  Comment:   Plan: fluconazole (DIFLUCAN) 150 MG tablet        For the yeast on the right arm, use Diflucan at 150 mg for one dose. There are refills if needed.       Jerry Carr MD  [facility]  "

## 2021-09-19 ENCOUNTER — HEALTH MAINTENANCE LETTER (OUTPATIENT)
Age: 46
End: 2021-09-19

## 2021-11-05 ENCOUNTER — ALLIED HEALTH/NURSE VISIT (OUTPATIENT)
Dept: FAMILY MEDICINE | Facility: CLINIC | Age: 46
End: 2021-11-05
Payer: COMMERCIAL

## 2021-11-05 DIAGNOSIS — Z23 NEED FOR PROPHYLACTIC VACCINATION AND INOCULATION AGAINST INFLUENZA: Primary | ICD-10-CM

## 2021-11-05 PROCEDURE — 90471 IMMUNIZATION ADMIN: CPT

## 2021-11-05 PROCEDURE — 90686 IIV4 VACC NO PRSV 0.5 ML IM: CPT

## 2021-11-05 PROCEDURE — 99207 PR NO CHARGE NURSE ONLY: CPT

## 2021-11-05 NOTE — PROGRESS NOTES
Patient presents to influenza program requesting influenza vaccination.  Standing orders implemented.    Vaccination given by YUDITH Caballero LPN on 11/5/2021 at 3:34 PM

## 2021-11-14 ENCOUNTER — HEALTH MAINTENANCE LETTER (OUTPATIENT)
Age: 46
End: 2021-11-14

## 2022-02-10 ENCOUNTER — OFFICE VISIT (OUTPATIENT)
Dept: URGENT CARE | Facility: URGENT CARE | Age: 47
End: 2022-02-10
Payer: COMMERCIAL

## 2022-02-10 ENCOUNTER — ANCILLARY PROCEDURE (OUTPATIENT)
Dept: GENERAL RADIOLOGY | Facility: CLINIC | Age: 47
End: 2022-02-10
Attending: STUDENT IN AN ORGANIZED HEALTH CARE EDUCATION/TRAINING PROGRAM
Payer: COMMERCIAL

## 2022-02-10 VITALS
HEART RATE: 82 BPM | RESPIRATION RATE: 16 BRPM | BODY MASS INDEX: 27.94 KG/M2 | DIASTOLIC BLOOD PRESSURE: 86 MMHG | SYSTOLIC BLOOD PRESSURE: 118 MMHG | WEIGHT: 213.25 LBS | TEMPERATURE: 97.4 F | OXYGEN SATURATION: 99 %

## 2022-02-10 DIAGNOSIS — R07.81 RIB PAIN ON LEFT SIDE: ICD-10-CM

## 2022-02-10 DIAGNOSIS — S20.212A CONTUSION OF RIB ON LEFT SIDE, INITIAL ENCOUNTER: Primary | ICD-10-CM

## 2022-02-10 PROCEDURE — 71101 X-RAY EXAM UNILAT RIBS/CHEST: CPT | Mod: LT | Performed by: RADIOLOGY

## 2022-02-10 PROCEDURE — 99213 OFFICE O/P EST LOW 20 MIN: CPT | Performed by: STUDENT IN AN ORGANIZED HEALTH CARE EDUCATION/TRAINING PROGRAM

## 2022-02-10 RX ORDER — IBUPROFEN 800 MG/1
800 TABLET, FILM COATED ORAL EVERY 8 HOURS
Qty: 60 TABLET | Refills: 1 | Status: SHIPPED | OUTPATIENT
Start: 2022-02-10 | End: 2022-08-04

## 2022-02-10 RX ORDER — CYCLOBENZAPRINE HCL 10 MG
10 TABLET ORAL 3 TIMES DAILY PRN
Qty: 45 TABLET | Refills: 0 | Status: SHIPPED | OUTPATIENT
Start: 2022-02-10 | End: 2022-08-04

## 2022-02-10 RX ORDER — HYDROCODONE BITARTRATE AND ACETAMINOPHEN 5; 325 MG/1; MG/1
1 TABLET ORAL EVERY 4 HOURS PRN
Qty: 18 TABLET | Refills: 0 | Status: SHIPPED | OUTPATIENT
Start: 2022-02-10 | End: 2022-02-13

## 2022-02-10 NOTE — PATIENT INSTRUCTIONS
Diagnosis: Rib injury, rule out fracture   Work-up: Awaiting radiologist read of x-ray  Treatment:   Ibuprofen 800 mg with food every 8 hours for the next 7-10 days.    Muscle relaxer cyclobenzaprine 10 mg every 8 hours as needed. Don't drive after taking this.    Pain medication - hydrocodone every 4 hours as needed for severe pain. Don't drive after taking this.    Alina Avendaño, L[place]      Patient Education     Rib Bruise   A rib bruise (contusion) can affect one or more rib bones. It may cause pain, tenderness, swelling, and a purplish discoloration. There may be a sharp pain while breathing.   You will be assessed for other injuries. You will likely be given pain medicine. Bruised ribs heal on their own, without further treatment. But the pain may take weeks to months to go away.    Note that a small crack (fracture) in the rib may cause the same symptoms as a bruised rib. The small crack may not be seen on a chest X-ray. But the conditions are managed in the same way.   Home care    Rest. Don't do heavy lifting, strenuous exertion, or any activity that causes pain.    Ice the area to reduce pain and swelling. Put ice cubes in a plastic bag or use a cold pack. Wrap the cold source in a thin towel. Don't place it directly on your skin. Ice the injured area for 20 minutes every 1 to 2 hours the first day. Continue with ice packs 3 to 4 times a day for the next 2 days, then as needed for the relief of pain and swelling.    Take any prescribed pain medicine as directed by your healthcare provider. If none was prescribed, take acetaminophen, ibuprofen, or naproxen to control pain.    If you have a significant injury, you may be given a device called an incentive spirometer to keep your lungs healthy. Use as directed.    Follow-up care  Follow up with your healthcare provider, or as advised.   When to seek medical advice  Call your healthcare provider for any of the following:     Increasing chest pain with  breathing    Coughing    New or worsening pain    Fever of 100.4 F (38 C) or higher, or as directed by your healthcare provider  Call 911  Call 911, or get medical care right away if any of the following occur:     Shortness of breath or trouble breathing    Dizziness, weakness, or fainting  Ruthie last reviewed this educational content on 8/1/2019 2000-2021 [employer]. All rights reserved. This information is not intended as a substitute for professional medical care. Always follow your healthcare professional's instructions.

## 2022-02-10 NOTE — PROGRESS NOTES
Assessment & Plan     Contusion of rib on left side, initial encounter  X-ray shows no fracture of ribs. Will treat for rib contusion with NSAIDs scheduled for 7-10 days, muscle relaxer as needed, icing/heat, rest, pain medication if needed for severe pain. Discussed healing of ribs often takes a couple weeks. Follow up as needed.  - cyclobenzaprine (FLEXERIL) 10 MG tablet  Dispense: 45 tablet; Refill: 0  - HYDROcodone-acetaminophen (NORCO) 5-325 MG tablet  Dispense: 18 tablet; Refill: 0  - ibuprofen (ADVIL/MOTRIN) 800 MG tablet  Dispense: 60 tablet; Refill: 1    Rib pain on left side  - XR Ribs & Chest Left G/E 3 Views         No follow-ups on file.    [facility]    Roopa Monroy is a 47 year old male who presents to clinic today for the following health issues:  Chief Complaint   Patient presents with     Rib Injury     today, stepped off truck and slipped on ice, fell on left side of rib. Pain with movement and deep breaths. No shortness of breath.      HPI          Review of Systems  Constitutional, HEENT, cardiovascular, pulmonary, gi and gu systems are negative, except as otherwise noted.      Objective    /86 (BP Location: Right arm, Patient Position: Sitting, Cuff Size: Adult Large)   Pulse 82   Temp 97.4  F (36.3  C) (Tympanic)   Resp 16   Wt 96.7 kg (213 lb 4 oz)   SpO2 99%   BMI 27.94 kg/m    Physical Exam   GENERAL APPEARANCE: alert and appears uncomfortable  RESP: lungs clear to auscultation - no rales, rhonchi or wheezes  MS: tenderness to palpation and possible bony step off of left 7th anterior rib, generalized tenderness to palpation of mid to lower left anterior ribs  SKIN: no suspicious lesions or rashes  NEURO: Normal strength and tone, mentation intact and speech normal    Results for orders placed or performed in visit on 02/10/22   XR Ribs & Chest Left G/E 3 Views     Status: None    Narrative    XR RIBS & CHEST  LT 3VW 2/10/2022 12:02 PM    HISTORY: Fell, left rib pain.    COMPARISON: None.      Impression    IMPRESSION: No rib fracture. The heart size is normal and the lungs  are clear. No pneumothorax or pleural effusion.    KAREY SNOW MD         SYSTEM ID:  [id number]

## 2022-07-19 ENCOUNTER — OFFICE VISIT (OUTPATIENT)
Dept: ORTHOPEDICS | Facility: CLINIC | Age: 47
End: 2022-07-19

## 2022-07-19 ENCOUNTER — DOCUMENTATION ONLY (OUTPATIENT)
Dept: ORTHOPEDICS | Facility: CLINIC | Age: 47
End: 2022-07-19

## 2022-07-19 ENCOUNTER — ANCILLARY PROCEDURE (OUTPATIENT)
Dept: GENERAL RADIOLOGY | Facility: CLINIC | Age: 47
End: 2022-07-19
Attending: STUDENT IN AN ORGANIZED HEALTH CARE EDUCATION/TRAINING PROGRAM
Payer: COMMERCIAL

## 2022-07-19 ENCOUNTER — HOSPITAL ENCOUNTER (OUTPATIENT)
Facility: AMBULATORY SURGERY CENTER | Age: 47
End: 2022-07-19
Attending: STUDENT IN AN ORGANIZED HEALTH CARE EDUCATION/TRAINING PROGRAM | Admitting: STUDENT IN AN ORGANIZED HEALTH CARE EDUCATION/TRAINING PROGRAM
Payer: COMMERCIAL

## 2022-07-19 VITALS — WEIGHT: 208.6 LBS | BODY MASS INDEX: 26.77 KG/M2 | HEIGHT: 74 IN

## 2022-07-19 DIAGNOSIS — R22.31 MASS OF RIGHT HAND: ICD-10-CM

## 2022-07-19 DIAGNOSIS — R22.31 MASS OF RIGHT HAND: Primary | ICD-10-CM

## 2022-07-19 DIAGNOSIS — L02.511 ABSCESS OF RIGHT HAND: ICD-10-CM

## 2022-07-19 PROCEDURE — 73130 X-RAY EXAM OF HAND: CPT | Mod: TC | Performed by: RADIOLOGY

## 2022-07-19 PROCEDURE — 99203 OFFICE O/P NEW LOW 30 MIN: CPT | Mod: 57 | Performed by: STUDENT IN AN ORGANIZED HEALTH CARE EDUCATION/TRAINING PROGRAM

## 2022-07-19 ASSESSMENT — PAIN SCALES - GENERAL: PAINLEVEL: NO PAIN (0)

## 2022-07-19 NOTE — LETTER
"    7/19/2022         RE: Torey Mata  [address] [address] [id number]        Dear Colleague,    Thank you for referring your patient, Torey Mata, to the [facility] [place]. Please see a copy of my visit note below.    Orthopaedic Surgery Hand and Upper Extremity Clinic H&P NOTE:  Date: Jul 19, 2022    Patient Name: Torey Mata  MRN: [id number]    CHIEF COMPLAINT: foreign body, right hand    Dominant Hand: right  Occupation: FAA      HPI:  Mr. Torey Mata is a 47 year old male right hand dominant who presents with injury to right hand. Patient reports that 1 year ago he was stung by bee on right hand and developed firm nodule that \"boiled up\". Has had small lump there since. Last week he had increase in swelling and redness while out of town for work so was seen and evaluated at a clinic in [place]. He was told he had a foreign body in his right hand. They aspirated \"green pus\" and sent for lab analysis, and placed patient on Keflex. He does not know the results.    At present time the patient reports a slightly larger than baseline seen in the first webspace of his right hand.  It is not draining and a small incision from the drainage and no other additional is well-healed.    The patient states this lesion has been symptomatic, and bothers him gripping objects.    Constant/Intermittent: intermittent  What makes it better or worse: tender to palpation, worse with pressure to area  Treatment: finished Keflex treatment yesterday  Previous Imaging: recent XR in [place]- patient brought imaging on disc    The patient denies pain elsewhere in the symptomatic upper extremity.    PAST MEDICAL HISTORY:  No past medical history on file.    PAST SURGICAL HISTORY:  Past Surgical History:   Procedure Laterality Date     HYDROCELECTOMY SCROTAL Right 2/7/2019    Procedure: Excision of right epididymal cyst;  Surgeon: Jake Griffin MD;  Location: [facility] OR " "      MEDICATIONS:  Current Outpatient Medications   Medication     ibuprofen (ADVIL/MOTRIN) 800 MG tablet     acetaminophen (TYLENOL) 500 MG tablet     cyclobenzaprine (FLEXERIL) 10 MG tablet     No current facility-administered medications for this visit.       ALLERGIES:     Allergies   Allergen Reactions     Amoxicillin Rash       FAMILY HISTORY:  No pertinent family history    SOCIAL HISTORY:  Social History     Tobacco Use     Smoking status: Never Smoker     Smokeless tobacco: Never Used   Substance Use Topics     Alcohol use: No     Drug use: No             The patient's past medical, family, and social history was reviewed and confirmed.    REVIEW OF SYMPTOMS:      General: Negative   Eyes: Negative   Ear, Nose and Throat: Negative   Respiratory: Negative   Cardiovascular: Negative   Gastrointestinal: Negative   Genito-urinary: Negative   Musculoskeletal: Negative  Neurological: Negative   Psychological: Negative  HEME: Negative   ENDO: Negative   SKIN: Negative    VITALS:  Vitals:    07/19/22 1329   Weight: 94.6 kg (208 lb 9.6 oz)   Height: 1.88 m (6' 2\")       EXAM:  General: NAD, A&Ox3  HEENT: NC/AT  CV: RRR by peripheral pulse  Pulmonary: Non-labored breathing on RA    Focused examination of the right hand demonstrates a 1 cm in the first webspace.  No evidence of erythema, drainage, underlying fluctuance.  Mobile firm nodule, without adherence to underlying structures.  Some adherence to overlying skin.  Sensory intact to light touch throughout the hand.  No motor deficits.  Hand warm well perfused.    IMAGING:  3 views of the right hand available for my personal review taken in clinic today demonstrate no acute fracture or dislocation, no evidence of radiopaque retained foreign body.    Ultrasound performed in clinic by myself demonstrated walled off structure with hypoechoic structure concerning for abscess or foreign body reaction. No foreign body was visualized.     I have personally reviewed the " above images and labs.     IMPRESSION AND RECOMMENDATIONS:  Mr. Torey Mata is a 47 year old male right hand dominant with likely foreign body reaction/abscess at the first webspace of right hand.    Patient has improved since last week, but mass remains larger than it was for the last year. He wishes to have it definitively addressed.    A detailed discussion was had with the patient regarding the risk benefits of further nonoperative, versus operative treatment.  After this thorough discussion, the patient has opted to proceed with I&D and excision of right hand mass.    The indications for surgery were discussed with the patient. The benefits, risks, and alternatives of operative management were discussed in detail with the patient. The patient understands that the risks of surgery include, but are not limited to: persistent infection, bleeding, injury to nearby structures (such as nerves, blood vessels, and tendons), wound healing problems, need for additional surgery, pain, stiffness, scarring, need for rehabilitation, and anesthetic complications.  Patient expressed understanding and elected to proceed with surgery. All questions were answered to the patient's satisfaction.      This procedure will be performed under local + MAC anesthesia.    Henry Tanner MD  PGY-4 Orthopaedic Surgery      I saw and evaluated the patient and agree with the findings and plan of care as documented in the note with the above addenda.    Manohar Treviño MD    Hand, Upper Extremity & Microvascular Surgery  Department of Orthopedic Surgery  [facility]           Again, thank you for allowing me to participate in the care of your patient.        Sincerely,        Manohar Treviño MD

## 2022-07-19 NOTE — PROGRESS NOTES
Left VM for patient informing him Dr. Treviño and Dr. Tanner agreed to fill out the DNR form for him. Requested patient to call back.     Rocio Solan MSA, ATC  Certified Athletic Trainer

## 2022-07-19 NOTE — PROGRESS NOTES
"[facility]opa[facility] H&P NOTE:  Date: Jul 19, 2022    Patient Name: Torey Mata  MRN: [id number]    CHIEF COMPLAINT: foreign body, right hand    Dominant Hand: right  Occupation: FAA      HPI:  Mr. Torey Mata is a 47 year old male right hand dominant who presents with injury to right hand. Patient reports that 1 year ago he was stung by bee on right hand and developed firm nodule that \"boiled up\". Has had small lump there since. Last week he had increase in swelling and redness while out of town for work so was seen and evaluated at a clinic in [place]. He was told he had a foreign body in his right hand. They aspirated \"green pus\" and sent for lab analysis, and placed patient on Keflex. He does not know the results.    At present time the patient reports a slightly larger than baseline seen in the first webspace of his right hand.  It is not draining and a small incision from the drainage and no other additional is well-healed.    The patient states this lesion has been symptomatic, and bothers him gripping objects.    Constant/Intermittent: intermittent  What makes it better or worse: tender to palpation, worse with pressure to area  Treatment: finished Keflex treatment yesterday  Previous Imaging: recent XR in [place]- patient brought imaging on disc    The patient denies pain elsewhere in the symptomatic upper extremity.    PAST MEDICAL HISTORY:  No past medical history on file.    PAST SURGICAL HISTORY:  Past Surgical History:   Procedure Laterality Date     HYDROCELECTOMY SCROTAL Right 2/7/2019    Procedure: Excision of right epididymal cyst;  Surgeon: Jake Griffin MD;  Location: [facility]       MEDICATIONS:  Current Outpatient Medications   Medication     ibuprofen (ADVIL/MOTRIN) 800 MG tablet     acetaminophen (TYLENOL) 500 MG tablet     cyclobenzaprine (FLEXERIL) 10 MG tablet     No current facility-administered medications for this visit.       ALLERGIES:   " "  Allergies   Allergen Reactions     Amoxicillin Rash       FAMILY HISTORY:  No pertinent family history    SOCIAL HISTORY:  Social History     Tobacco Use     Smoking status: Never Smoker     Smokeless tobacco: Never Used   Substance Use Topics     Alcohol use: No     Drug use: No             The patient's past medical, family, and social history was reviewed and confirmed.    REVIEW OF SYMPTOMS:      General: Negative   Eyes: Negative   Ear, Nose and Throat: Negative   Respiratory: Negative   Cardiovascular: Negative   Gastrointestinal: Negative   Genito-urinary: Negative   Musculoskeletal: Negative  Neurological: Negative   Psychological: Negative  HEME: Negative   ENDO: Negative   SKIN: Negative    VITALS:  Vitals:    07/19/22 1329   Weight: 94.6 kg (208 lb 9.6 oz)   Height: 1.88 m (6' 2\")       EXAM:  General: NAD, A&Ox3  HEENT: NC/AT  CV: RRR by peripheral pulse  Pulmonary: Non-labored breathing on RA    Focused examination of the right hand demonstrates a 1 cm in the first webspace.  No evidence of erythema, drainage, underlying fluctuance.  Mobile firm nodule, without adherence to underlying structures.  Some adherence to overlying skin.  Sensory intact to light touch throughout the hand.  No motor deficits.  Hand warm well perfused.    IMAGING:  3 views of the right hand available for my personal review taken in clinic today demonstrate no acute fracture or dislocation, no evidence of radiopaque retained foreign body.    Ultrasound performed in clinic by myself demonstrated walled off structure with hypoechoic structure concerning for abscess or foreign body reaction. No foreign body was visualized.     I have personally reviewed the above images and labs.     IMPRESSION AND RECOMMENDATIONS:  Mr. Torey Mata is a 47 year old male right hand dominant with likely foreign body reaction/abscess at the first webspace of right hand.    Patient has improved since last week, but mass remains larger than it was " for the last year. He wishes to have it definitively addressed.    A detailed discussion was had with the patient regarding the risk benefits of further nonoperative, versus operative treatment.  After this thorough discussion, the patient has opted to proceed with I&D and excision of right hand mass.    The indications for surgery were discussed with the patient. The benefits, risks, and alternatives of operative management were discussed in detail with the patient. The patient understands that the risks of surgery include, but are not limited to: persistent infection, bleeding, injury to nearby structures (such as nerves, blood vessels, and tendons), wound healing problems, need for additional surgery, pain, stiffness, scarring, need for rehabilitation, and anesthetic complications.  Patient expressed understanding and elected to proceed with surgery. All questions were answered to the patient's satisfaction.      This procedure will be performed under local + MAC anesthesia.    Henry Tanner MD  PGY-4 Orthopaedic Surgery      I saw and evaluated the patient and agree with the findings and plan of care as documented in the note with the above addenda.    Manohar Treviño MD    [facility], Upper Extremity & Microvascular Surgery  Department of Orthopedic Surgery  [facility]

## 2022-07-20 ENCOUNTER — TELEPHONE (OUTPATIENT)
Dept: ORTHOPEDICS | Facility: CLINIC | Age: 47
End: 2022-07-20

## 2022-07-20 NOTE — TELEPHONE ENCOUNTER
Scheduled surgery    Dr Treviño-- Patient is inquiring if this can be done under local anesthesia instead.  He is having trouble securing a  for post op.     Type of surgery: right hand irrigation and debridement  Location of surgery: [facility]  Date and time of surgery: 8/5/22  Surgeon: Hudson  Pre-Op Appt Date: 8/4/22  Post-Op Appt Date: 8/15/22   Packet sent out: Yes  Pre-cert/Authorization completed:  No  Date: 7/20/22      Willy Arango Surgery Scheduler

## 2022-07-27 ENCOUNTER — TELEPHONE (OUTPATIENT)
Dept: ORTHOPEDICS | Facility: CLINIC | Age: 47
End: 2022-07-27

## 2022-07-27 NOTE — PROGRESS NOTES
LVM for patient to let him know [employer] form has been completed. Asked for return call with mailing address or if he'd like to  from clinic.     Rocio Sloan MSA, ATC  Certified Athletic Trainer

## 2022-07-27 NOTE — TELEPHONE ENCOUNTER
[facility] Call Center    Phone Message    May a detailed message be left on voicemail: yes     Reason for Call: Other: pt callinlg back to speak to Dr. Hudson Edwards's nurse.  Advising paperwork for Wade broderick with Dr. Treviño's signatture - and he would like to have mailed to his home      [address] [place] [address]      Action Taken: Message routed to:  [facility] Dr. Treviño  [facility]    Travel Screening: Not Applicable

## 2022-07-27 NOTE — TELEPHONE ENCOUNTER
Informed patient form will be mailed to provided address.    Rocio Sloan MSA, ATC  Certified Athletic Trainer

## 2022-08-01 NOTE — PROGRESS NOTES
Now duplicate encounter. See encounter dated 7/27/22.     Rocio Sloan MSA, ATC  Certified Athletic Trainer

## 2022-08-02 RX ORDER — LIDOCAINE HYDROCHLORIDE AND EPINEPHRINE 10; 10 MG/ML; UG/ML
10 INJECTION, SOLUTION INFILTRATION; PERINEURAL ONCE
Status: CANCELLED | OUTPATIENT
Start: 2022-08-05

## 2022-08-04 ENCOUNTER — OFFICE VISIT (OUTPATIENT)
Dept: FAMILY MEDICINE | Facility: CLINIC | Age: 47
End: 2022-08-04
Payer: COMMERCIAL

## 2022-08-04 ENCOUNTER — TELEPHONE (OUTPATIENT)
Dept: ORTHOPEDICS | Facility: CLINIC | Age: 47
End: 2022-08-04

## 2022-08-04 VITALS
SYSTOLIC BLOOD PRESSURE: 122 MMHG | HEART RATE: 72 BPM | RESPIRATION RATE: 18 BRPM | DIASTOLIC BLOOD PRESSURE: 88 MMHG | TEMPERATURE: 97.5 F | WEIGHT: 205.2 LBS | BODY MASS INDEX: 26.35 KG/M2

## 2022-08-04 DIAGNOSIS — R22.31 MASS OF RIGHT HAND: ICD-10-CM

## 2022-08-04 DIAGNOSIS — Z01.818 PREOP GENERAL PHYSICAL EXAM: ICD-10-CM

## 2022-08-04 DIAGNOSIS — E11.65 TYPE 2 DIABETES MELLITUS WITH HYPERGLYCEMIA, WITHOUT LONG-TERM CURRENT USE OF INSULIN (H): Primary | ICD-10-CM

## 2022-08-04 LAB
ALBUMIN SERPL-MCNC: 3.9 G/DL (ref 3.4–5)
ALP SERPL-CCNC: 75 U/L (ref 40–150)
ALT SERPL W P-5'-P-CCNC: 69 U/L (ref 0–70)
ANION GAP SERPL CALCULATED.3IONS-SCNC: 7 MMOL/L (ref 3–14)
AST SERPL W P-5'-P-CCNC: 26 U/L (ref 0–45)
BILIRUB DIRECT SERPL-MCNC: 0.1 MG/DL (ref 0–0.2)
BILIRUB SERPL-MCNC: 0.8 MG/DL (ref 0.2–1.3)
BUN SERPL-MCNC: 15 MG/DL (ref 7–30)
CALCIUM SERPL-MCNC: 8.9 MG/DL (ref 8.5–10.1)
CHLORIDE BLD-SCNC: 98 MMOL/L (ref 94–109)
CHOLEST SERPL-MCNC: 222 MG/DL
CO2 SERPL-SCNC: 27 MMOL/L (ref 20–32)
CREAT SERPL-MCNC: 0.8 MG/DL (ref 0.66–1.25)
ERYTHROCYTE [DISTWIDTH] IN BLOOD BY AUTOMATED COUNT: 12 % (ref 10–15)
FASTING STATUS PATIENT QL REPORTED: ABNORMAL
GFR SERPL CREATININE-BSD FRML MDRD: >90 ML/MIN/1.73M2
GLUCOSE BLD-MCNC: 334 MG/DL (ref 70–99)
HBA1C MFR BLD: 10.5 % (ref 0–5.6)
HCT VFR BLD AUTO: 46.8 % (ref 40–53)
HDLC SERPL-MCNC: 28 MG/DL
HGB BLD-MCNC: 17.1 G/DL (ref 13.3–17.7)
LDLC SERPL CALC-MCNC: 75 MG/DL
LDLC SERPL CALC-MCNC: ABNORMAL MG/DL
MCH RBC QN AUTO: 31.3 PG (ref 26.5–33)
MCHC RBC AUTO-ENTMCNC: 36.5 G/DL (ref 31.5–36.5)
MCV RBC AUTO: 86 FL (ref 78–100)
NONHDLC SERPL-MCNC: 194 MG/DL
PLATELET # BLD AUTO: 180 10E3/UL (ref 150–450)
POTASSIUM BLD-SCNC: 4.5 MMOL/L (ref 3.4–5.3)
PROT SERPL-MCNC: 7.4 G/DL (ref 6.8–8.8)
RBC # BLD AUTO: 5.46 10E6/UL (ref 4.4–5.9)
SODIUM SERPL-SCNC: 132 MMOL/L (ref 133–144)
TRIGL SERPL-MCNC: 911 MG/DL
WBC # BLD AUTO: 6.2 10E3/UL (ref 4–11)

## 2022-08-04 PROCEDURE — 80053 COMPREHEN METABOLIC PANEL: CPT | Performed by: PHYSICIAN ASSISTANT

## 2022-08-04 PROCEDURE — 84681 ASSAY OF C-PEPTIDE: CPT | Performed by: PHYSICIAN ASSISTANT

## 2022-08-04 PROCEDURE — 82248 BILIRUBIN DIRECT: CPT | Performed by: PHYSICIAN ASSISTANT

## 2022-08-04 PROCEDURE — 83721 ASSAY OF BLOOD LIPOPROTEIN: CPT | Mod: 59 | Performed by: PHYSICIAN ASSISTANT

## 2022-08-04 PROCEDURE — 99214 OFFICE O/P EST MOD 30 MIN: CPT | Performed by: PHYSICIAN ASSISTANT

## 2022-08-04 PROCEDURE — 83036 HEMOGLOBIN GLYCOSYLATED A1C: CPT | Performed by: PHYSICIAN ASSISTANT

## 2022-08-04 PROCEDURE — 85027 COMPLETE CBC AUTOMATED: CPT | Performed by: PHYSICIAN ASSISTANT

## 2022-08-04 PROCEDURE — 36415 COLL VENOUS BLD VENIPUNCTURE: CPT | Performed by: PHYSICIAN ASSISTANT

## 2022-08-04 PROCEDURE — 80061 LIPID PANEL: CPT | Performed by: PHYSICIAN ASSISTANT

## 2022-08-04 RX ORDER — GLUCOSAMINE HCL/CHONDROITIN SU 500-400 MG
CAPSULE ORAL
Qty: 100 EACH | Refills: 3 | Status: SHIPPED | OUTPATIENT
Start: 2022-08-04

## 2022-08-04 RX ORDER — LANCETS
EACH MISCELLANEOUS
Qty: 100 EACH | Refills: 6 | Status: SHIPPED | OUTPATIENT
Start: 2022-08-04

## 2022-08-04 RX ORDER — METFORMIN HCL 500 MG
TABLET, EXTENDED RELEASE 24 HR ORAL
Qty: 290 TABLET | Refills: 0 | Status: SHIPPED | OUTPATIENT
Start: 2022-08-04 | End: 2022-10-31

## 2022-08-04 ASSESSMENT — PAIN SCALES - GENERAL: PAINLEVEL: NO PAIN (0)

## 2022-08-04 NOTE — PROGRESS NOTES
I want this done [facility]  [address] [place] [id number]  Phone: [phone]  Fax: [phone]  Primary Provider: [facility]  Pre-op Performing Provider: JOSE CARLOS JACKSON    PREOPERATIVE EVALUATION:  Today's date: 8/4/2022    Torey Mata is a 47 year old male who presents for a preoperative evaluation.    Surgical Information:  Surgery/Procedure: IRRIGATION AND DEBRIDEMENT, RIGHT HAND  Surgery Location: [facility]   Surgeon: Dr. Manohar Treviño   Surgery Date: 08/05/22  Time of Surgery: 8:55am   Where patient plans to recover: At home with family  Fax number for surgical facility: Note does not need to be faxed, will be available electronically in Epic.    Type of Anesthesia Anticipated: Local    Assessment & Plan     The proposed surgical procedure is considered LOW risk.    Problem List Items Addressed This Visit        Musculoskeletal and Integumentary    Mass of right hand      Other Visit Diagnoses     Type 2 diabetes mellitus with hyperglycemia, without long-term current use of insulin (H)    -  Primary    Relevant Medications    metFORMIN (GLUCOPHAGE XR) 500 MG 24 hr tablet    blood glucose monitoring (NO BRAND SPECIFIED) meter device kit    blood glucose (NO BRAND SPECIFIED) test strip    thin (NO BRAND SPECIFIED) lancets    alcohol swab prep pads    Other Relevant Orders    Hemoglobin A1c (Completed)    C-peptide    Lipid panel reflex to direct LDL Fasting    Albumin Random Urine Quantitative with Creat Ratio    Hepatic panel (Albumin, ALT, AST, Bili, Alk Phos, TP)    Glutamic acid decarboxylase antibody    AMB Adult Diabetes Educator Referral    Preop general physical exam        Relevant Orders    Basic metabolic panel  (Ca, Cl, CO2, Creat, Gluc, K, Na, BUN)    CBC with platelets (Completed)        Risks and Recommendations:  The patient has the following additional risks and recommendations for  perioperative complications:   - No identified additional risk factors other than previously addressed    Medication Instructions:  Patient is on no chronic medications     Of note the patient's A1c came back at 10.5%.  This is a new diagnosis.  The patient was called and discussed the results.    RECOMMENDATION:  Do not recommend surgery at this time due to new diagnosis of uncontrolled diabetes. Surgical team was contacted and may aware of the pre-op finding and Dr. Treviño agreed to delay surgery until A1c is at goal < 8%.     Subjective   HPI related to upcoming procedure: Patient is a 47-year-old male with past medical history of prediabetes who presents for preoperative evaluation for proposed irrigation and debridement of a mass of the right hand.  Patient states he is in his usual state of health and denies any fevers or chills, URI symptoms, chest pain or shortness of breath, abdominal pain, nausea or vomiting, diarrhea constipation or any other concerning systemic sign or symptom.    Preop Questions 8/4/2022   1. Have you ever had a heart attack or stroke? No   2. Have you ever had surgery on your heart or blood vessels, such as a stent placement, a coronary artery bypass, or surgery on an artery in your head, neck, heart, or legs? No   3. Do you have chest pain with activity? No   4. Do you have a history of  heart failure? No   5. Do you currently have a cold, bronchitis or symptoms of other infection? No   6. Do you have a cough, shortness of breath, or wheezing? No   7. Do you or anyone in your family have previous history of blood clots? No   8. Do you or does anyone in your family have a serious bleeding problem such as prolonged bleeding following surgeries or cuts? No   9. Have you ever had problems with anemia or been told to take iron pills? No   10. Have you had any abnormal blood loss such as black, tarry or bloody stools? No   11. Have you ever had a blood transfusion? No   12. Are you willing to  have a blood transfusion if it is medically needed before, during, or after your surgery? Yes   13. Have you or any of your relatives ever had problems with anesthesia? No   14. Do you have sleep apnea, excessive snoring or daytime drowsiness? No   15. Do you have any artifical heart valves or other implanted medical devices like a pacemaker, defibrillator, or continuous glucose monitor? No   16. Do you have artificial joints? No   17. Are you allergic to latex? No     Health Care Directive:  Patient does not have a Health Care Directive or Living Will: Discussed advance care planning with patient; information given to patient to review.    Preoperative Review of :   reviewed - no record of controlled substances prescribed.    Status of Chronic Conditions:  See problem list for active medical problems.  Problems all longstanding and stable, except as noted/documented.  See ROS for pertinent symptoms related to these conditions.    Review of Systems  CONSTITUTIONAL: NEGATIVE for fever, chills, change in weight  INTEGUMENTARY/SKIN: NEGATIVE for worrisome rashes, moles or lesions  EYES: NEGATIVE for vision changes or irritation  ENT/MOUTH: NEGATIVE for ear, mouth and throat problems  RESP: NEGATIVE for significant cough or SOB  CV: NEGATIVE for chest pain, palpitations or peripheral edema  GI: NEGATIVE for nausea, abdominal pain, heartburn, or change in bowel habits  : NEGATIVE for frequency, dysuria, or hematuria  MUSCULOSKELETAL: NEGATIVE for significant arthralgias or myalgia  NEURO: NEGATIVE for weakness, dizziness or paresthesias  ENDOCRINE: NEGATIVE for temperature intolerance, skin/hair changes  HEME: NEGATIVE for bleeding problems  PSYCHIATRIC: NEGATIVE for changes in mood or affect    Patient Active Problem List    Diagnosis Date Noted     Mass of right hand 07/19/2022     Priority: Medium     Added automatically from request for surgery [id number]       Abscess of right hand 07/19/2022     Priority:  Medium     Added automatically from request for surgery [id number]       Tinea versicolor 06/08/2015     Priority: Medium     CARDIOVASCULAR SCREENING; LDL GOAL LESS THAN 160 10/31/2010     Priority: Medium      History reviewed. No pertinent past medical history.  Past Surgical History:   Procedure Laterality Date     HYDROCELECTOMY SCROTAL Right 2/7/2019    Procedure: Excision of right epididymal cyst;  Surgeon: Jake Griffin MD;  Location: [facility]     Current Outpatient Medications   Medication Sig Dispense Refill     alcohol swab prep pads Use to swab area of injection/la nena as directed. 100 each 3     blood glucose (NO BRAND SPECIFIED) test strip Use to test blood sugar 1 times daily or as directed. To accompany: Blood Glucose Monitor Brands: per insurance. 100 strip 6     blood glucose monitoring (NO BRAND SPECIFIED) meter device kit Use to test blood sugar 1 times daily or as directed. Preferred blood glucose meter OR supplies to accompany: Blood Glucose Monitor Brands: per insurance. 1 kit 0     metFORMIN (GLUCOPHAGE XR) 500 MG 24 hr tablet Take 1 tablet (500 mg) by mouth daily (with dinner) for 14 days, THEN 1 tablet (500 mg) 2 times daily (with meals) for 14 days, THEN 2 tablets (1,000 mg) 2 times daily (with meals) for 62 days. 290 tablet 0     thin (NO BRAND SPECIFIED) lancets Use to test blood sugar 1 times daily or as directed. To accompany: Blood Glucose Monitor Brands: per insurance. 100 each 6       Allergies   Allergen Reactions     Amoxicillin Rash        Social History     Tobacco Use     Smoking status: Never Smoker     Smokeless tobacco: Never Used   Substance Use Topics     Alcohol use: No       History   Drug Use No         Objective     /88 (BP Location: Right arm, Patient Position: Sitting, Cuff Size: Adult Regular)   Pulse 72   Temp 97.5  F (36.4  C) (Tympanic)   Resp 18   Wt 93.1 kg (205 lb 3.2 oz)   BMI 26.35 kg/m      Physical Exam    GENERAL APPEARANCE: healthy,  alert and no distress     EYES: EOMI,  PERRL     HENT: ear canals and TM's normal and nose and mouth without ulcers or lesions     NECK: no adenopathy, no asymmetry, masses, or scars and thyroid normal to palpation     RESP: lungs clear to auscultation - no rales, rhonchi or wheezes     CV: regular rates and rhythm, normal S1 S2, no S3 or S4 and no murmur, click or rub     ABDOMEN:  soft, nontender, no HSM or masses and bowel sounds normal     MS: extremities normal- no gross deformities noted, no evidence of inflammation in joints, FROM in all extremities.     SKIN: no suspicious lesions or rashes     NEURO: Normal strength and tone, sensory exam grossly normal, mentation intact and speech normal     PSYCH: mentation appears normal. and affect normal/bright     LYMPHATICS: No cervical adenopathy    No results for input(s): HGB, PLT, INR, NA, POTASSIUM, CR, A1C in the last [id number] hours.     Diagnostics:  Results for orders placed or performed in visit on 08/04/22   CBC with platelets     Status: Normal   Result Value Ref Range    WBC Count 6.2 4.0 - 11.0 10e3/uL    RBC Count 5.46 4.40 - 5.90 10e6/uL    Hemoglobin 17.1 13.3 - 17.7 g/dL    Hematocrit 46.8 40.0 - 53.0 %    MCV 86 78 - 100 fL    MCH 31.3 26.5 - 33.0 pg    MCHC 36.5 31.5 - 36.5 g/dL    RDW 12.0 10.0 - 15.0 %    Platelet Count 180 150 - 450 10e3/uL   Hemoglobin A1c     Status: Abnormal   Result Value Ref Range    Hemoglobin A1C 10.5 (H) 0.0 - 5.6 %      No EKG required for low risk surgery (cataract, skin procedure, breast biopsy, etc).    Revised Cardiac Risk Index (RCRI):  The patient has the following serious cardiovascular risks for perioperative complications:   - No serious cardiac risks = 0 points     RCRI Interpretation: 0 points: Class I (very low risk - 0.4% complication rate)     Signed Electronically by: Braulio Zee PA-C  Copy of this evaluation report is provided to requesting physician.

## 2022-08-04 NOTE — TELEPHONE ENCOUNTER
"[facility]    Phone Message    May a detailed message be left on voicemail: yes     Reason for Call: Other: Anay from [facility] called to say patient came in for his pre-op physical and his A1C was really high.      Anay is requesting a call back @ [phone] and ask for \"Anay on [facility]'s team\" to discuss whether patient should still have surgery that's scheduled for tomorrow.    Action Taken: Message routed to:  [facility] ortho    Travel Screening: Not Applicable                                                                      "

## 2022-08-04 NOTE — PATIENT INSTRUCTIONS
Patient Education     Presurgery Checklist  You are scheduled to have surgery. The healthcare staff will try to make your stay comfortable. Use the guidelines below to remind yourself what to do before surgery. Be sure to follow any specific pre-op instructions from your surgeon or nurse.  Preparing for surgery  If you are having abdominal surgery, ask what you need to do to clear your bowel.  Tell your surgeon if you have allergies to any medicines, latex, or foods.  Ask your surgeon if you might need a blood transfusion during surgery and if so, how to prepare for it. In some cases, you can donate blood before surgery. If needed, this blood can be given back (transfused) to you during or after surgery.  Arrange for an adult family member or friend to drive you home after surgery. If possible, have someone ready to help you at home as you recover.  Call the surgeon if you get a cold, fever, sore throat, diarrhea, or other health problem just before surgery. Your surgeon can decide whether or not to postpone the surgery.  Medicines  Tell your surgeon about all medicines you take, including prescription and over-the-counter products such as herbal remedies and vitamins. Ask if you should continue taking them.  If you take ibuprofen, naproxen, or blood thinners (anticoagulants) such as aspirin, clopidogrel, or warfarin, ask your surgeon whether you should stop taking them and how long before surgery you should stop.  You may be told to take antibiotics just before surgery to prevent infection. If so, follow instructions carefully on how to take them.  If you are told to take blood thinners to help prevent blood clots after surgery, be sure to follow the instructions on how to take them.  Stop smoking  If you smoke, healing may take longer. So at least 2 week(s) before surgery, stop smoking.  Bathing or showering before surgery  If instructed, wash with antibacterial soap. Afterward, do not use lotions, oils, or  powders.  If you are having surgery on the head, you may be asked to shampoo with antibacterial soap. Follow instructions for doing so.  Do not remove hair from the surgery site  Do not shave hair from the incision site, unless you are given specific instructions to do so. Usually, if hair needs to be removed, it will be done at the hospital right before surgery.  Don t eat or drink  Follow any directions you are given for not eating or drinking before surgery. If you don't follow instructions about when to stop eating and drinking, your procedure may be postponed or rescheduled for another day. This is a safety issue.  You can brush your teeth and rinse your mouth, but don t swallow any water.  Day of surgery  Don't wear makeup. Don't use perfume, deodorant, or hairspray. Remove nail polish and artificial nails.  Leave jewelry (including rings), watches, and other valuables at home.  Be sure to bring health insurance cards or forms and a photo ID.  Bring a list of your medicines (include the name, dose, how often you take them, and the time last dose was taken).  Arrive on time at the hospital or surgery facility.  Date Last Reviewed: 12/1/2016 2000-2018 [employer]. [address] [place] [address], [place] [address]. All rights reserved. This information is not intended as a substitute for professional medical care. Always follow your healthcare professional's instructions.

## 2022-08-04 NOTE — TELEPHONE ENCOUNTER
Spoke to patient. Informed patient that surgery will be cancelled due to high A1C.  Goal is to have A1c down to 7-8. Patient verbalized understanding.        Willy Aarngo, Surgery Scheduler

## 2022-08-04 NOTE — TELEPHONE ENCOUNTER
Phone call to Dr. Jess Cueva's office. She was informed of Dr. Treviño's recommendations below to cancel surgery. She verbalized understanding.     Routing to surgery scheduling.     LUC Cagle RN

## 2022-08-04 NOTE — TELEPHONE ENCOUNTER
Phone call to Dr. Jess Cueva's office. She states patient was seen today for pre-op physical. He was previously pre-diabetic and had a A1-C of 10.5 today. He is a newly diagnosed diabetic and declined insulin. He was started on Metformin and set up with a glucometer to check blood sugars.   Her provider wants to know if they should not clear him for surgery and get diabetes under control first.   Will discuss with Dr. Treviño and get back with her. She is there until 5pm today.     Patient is scheduled for I&D and excision of right hand mass 8/5/22 with Dr. Treviño.     Please advise if surgery can be cancelled and rescheduled once diabetes under better control, and if patient can call if any signs of infection in the meantime.     LUC Cagle RN

## 2022-08-04 NOTE — TELEPHONE ENCOUNTER
Cancel 8/5/22 surgery.     Patient completed the H&P today.  A1C is 10.5.  Dr. Treviño made aware of high A1C. Recommends A1C to be 7-8 before moving forward with surgery.     Patient verbalized understanding.        Willy Arango Surgery Scheduler

## 2022-08-04 NOTE — TELEPHONE ENCOUNTER
Per Dr. Treviño :     Yes they should not clear him for surgery and get his diabetes under control. We should cancel his surgery.     This is a chronic abscess and is stable.     He can reschedule once A1c is at goal, around 7-8.

## 2022-08-05 LAB — C PEPTIDE SERPL-MCNC: 4.1 NG/ML (ref 0.9–6.9)

## 2022-08-12 ENCOUNTER — LAB (OUTPATIENT)
Dept: LAB | Facility: CLINIC | Age: 47
End: 2022-08-12
Payer: COMMERCIAL

## 2022-08-12 DIAGNOSIS — E11.65 TYPE 2 DIABETES MELLITUS WITH HYPERGLYCEMIA, WITHOUT LONG-TERM CURRENT USE OF INSULIN (H): ICD-10-CM

## 2022-08-12 LAB
CREAT UR-MCNC: 263 MG/DL
MICROALBUMIN UR-MCNC: 38 MG/L
MICROALBUMIN/CREAT UR: 14.45 MG/G CR (ref 0–17)

## 2022-08-12 PROCEDURE — 99000 SPECIMEN HANDLING OFFICE-LAB: CPT

## 2022-08-12 PROCEDURE — 36415 COLL VENOUS BLD VENIPUNCTURE: CPT

## 2022-08-12 PROCEDURE — 82043 UR ALBUMIN QUANTITATIVE: CPT

## 2022-08-12 PROCEDURE — 86341 ISLET CELL ANTIBODY: CPT | Mod: 90

## 2022-08-15 LAB — GAD65 AB SER IA-ACNC: <5 IU/ML

## 2022-08-18 ENCOUNTER — OFFICE VISIT (OUTPATIENT)
Dept: FAMILY MEDICINE | Facility: CLINIC | Age: 47
End: 2022-08-18
Payer: COMMERCIAL

## 2022-08-18 ENCOUNTER — TRANSFERRED RECORDS (OUTPATIENT)
Dept: FAMILY MEDICINE | Facility: CLINIC | Age: 47
End: 2022-08-18

## 2022-08-18 VITALS
RESPIRATION RATE: 18 BRPM | DIASTOLIC BLOOD PRESSURE: 80 MMHG | TEMPERATURE: 97.1 F | HEART RATE: 80 BPM | BODY MASS INDEX: 26.4 KG/M2 | SYSTOLIC BLOOD PRESSURE: 120 MMHG | WEIGHT: 205.6 LBS

## 2022-08-18 DIAGNOSIS — E11.65 TYPE 2 DIABETES MELLITUS WITH HYPERGLYCEMIA, WITHOUT LONG-TERM CURRENT USE OF INSULIN (H): Primary | ICD-10-CM

## 2022-08-18 DIAGNOSIS — E78.1 HYPERTRIGLYCERIDEMIA: ICD-10-CM

## 2022-08-18 PROBLEM — E11.9 DIABETES MELLITUS, TYPE 2 (H): Status: ACTIVE | Noted: 2022-08-18

## 2022-08-18 LAB
RETINOPATHY: NEGATIVE
RETINOPATHY: NEGATIVE

## 2022-08-18 PROCEDURE — 99214 OFFICE O/P EST MOD 30 MIN: CPT | Performed by: PHYSICIAN ASSISTANT

## 2022-08-18 ASSESSMENT — PAIN SCALES - GENERAL: PAINLEVEL: NO PAIN (0)

## 2022-08-18 NOTE — PROGRESS NOTES
" Assessment & Plan   Type 2 diabetes mellitus with hyperglycemia, without long-term current use of insulin (H)  Doing very well after the new diagnosis. Lots of lifestyle change. Pt is very motivated to stay off of medicine to treat his diabetes. Has appt with DM education here in a few weeks. Continue metformin for now, and follow-up in 2-3 months for a recheck.     Hypertriglyceridemia  Monitor for now. If remains elevated, he may benefit from a statin.     BMI:   Estimated body mass index is 26.4 kg/m  as calculated from the following:    Height as of 7/19/22: 1.88 m (6' 2\").    Weight as of this encounter: 93.3 kg (205 lb 9.6 oz).     Return in about 3 months (around 11/18/2022) for In-Clinic Visit, Diabetes check, Lab Work.    REYNALDO Echavarria [facility]    Roopa Monroy is a 47 year old, presenting for the following health issues:  Diabetes      History of Present Illness       Diabetes:   He presents for follow up of diabetes.  He is checking home blood glucose one time daily. He checks blood glucose before meals.  Blood glucose is never over 200 and never under 70. When his blood glucose is low, the patient is asymptomatic for confusion, blurred vision, lethargy and reports not feeling dizzy, shaky, or weak.  He has no concerns regarding his diabetes at this time.  He is not experiencing numbness or burning in feet, excessive thirst, blurry vision, weight changes or redness, sores or blisters on feet. The patient has not had a diabetic eye exam in the last 12 months.         He eats 4 or more servings of fruits and vegetables daily.He consumes 0 sweetened beverage(s) daily.He exercises with enough effort to increase his heart rate 60 or more minutes per day.  He exercises with enough effort to increase his heart rate 6 days per week.   He is taking medications regularly.    Review of Systems   See HPI       Objective    /80 (BP Location: Right arm, Patient " Position: Sitting, Cuff Size: Adult Regular)   Pulse 80   Temp 97.1  F (36.2  C) (Tympanic)   Resp 18   Wt 93.3 kg (205 lb 9.6 oz)   BMI 26.40 kg/m    Body mass index is 26.4 kg/m .  Physical Exam   Constitutional: healthy, alert, and no distress  Head: Normocephalic. Atraumatic  Eyes: No conjunctival injection, sclera anicteric  Respiratory: No resp distress.  Musculoskeletal: extremities normal- no gross deformities noted, and normal muscle tone  Neurologic: Gait normal. CN 2-12 grossly intact  Psychiatric: mentation appears normal and affect normal/bright   Diabetic foot exam: normal DP and PT pulses, no trophic changes or ulcerative lesions and normal sensory exam              .  ..

## 2022-08-24 DIAGNOSIS — E11.65 TYPE 2 DIABETES MELLITUS WITH HYPERGLYCEMIA, WITHOUT LONG-TERM CURRENT USE OF INSULIN (H): Primary | ICD-10-CM

## 2022-09-20 ENCOUNTER — ALLIED HEALTH/NURSE VISIT (OUTPATIENT)
Dept: EDUCATION SERVICES | Facility: CLINIC | Age: 47
End: 2022-09-20
Payer: COMMERCIAL

## 2022-09-20 DIAGNOSIS — E11.65 TYPE 2 DIABETES MELLITUS WITH HYPERGLYCEMIA, WITHOUT LONG-TERM CURRENT USE OF INSULIN (H): ICD-10-CM

## 2022-09-20 PROCEDURE — G0108 DIAB MANAGE TRN  PER INDIV: HCPCS | Performed by: DIETITIAN, REGISTERED

## 2022-09-20 NOTE — PATIENT INSTRUCTIONS
Time in range at least 70% is your goal.         2.    Watch carbohydrate grams 45-60 at meals, 15-30 grams at snacks.      3.    Stay active daily: for sure the 150 minutes weekly

## 2022-09-20 NOTE — PROGRESS NOTES
Diabetes Self-Management Education & Support    Presents for: Initial Assessment for new diagnosis    CDE VISIT MODE: In Person    Assessment Type:   ASSESSMENT:  -new to diabetes doing well, has made many changes already,is watching carbs and working on being active  -set him up with the tami 2 sensor so he is able to more closely monitor his numbers, have him voucher code  -he is running 120-140 with am readings fasting on accu-check meter  -he is currently taking 1500 mg of metformin and would ideally like to get off medications  -he has lost 10 lbs      Patient's most recent   Lab Results   Component Value Date    A1C 10.5 08/04/2022     is not meeting goal of <8.0    Diabetes knowledge and skills assessment:   Patient is knowledgeable in diabetes management concepts related to: Healthy Eating, Being Active and Monitoring    Continue education with the following diabetes management concepts: Healthy Eating, Being Active, Monitoring, Taking Medication, Problem Solving, Reducing Risks and Healthy Coping    Based on learning assessment above, most appropriate setting for further diabetes education would be: Individual setting.      PLAN      Topics to cover at upcoming visits: Healthy Eating, Being Active, Monitoring, Taking Medication, Problem Solving, Reducing Risks and Healthy Coping    Follow-up: in two months    See Care Plan for co-developed, patient-state behavior change goals.  AVS provided for patient today.    Education Materials Provided:  [employer] Healthy Living with Diabetes Book, Carbohydrate Counting and tami 2 sensor      SUBJECTIVE/OBJECTIVE:  Presents for: Initial Assessment for new diagnosis  Accompanied by: Self  Diabetes education in the past 24mo: No  Diabetes type: Type 2  Cultural Influences/Ethnic Background:  Choose not to answer      Diabetes Symptoms & Complications:  Fatigue: No  Neuropathy: No  Polydipsia: No  Autonomic neuropathy: No  CVA: No  Heart disease: No    Patient  "Problem List and Family Medical History reviewed for relevant medical history, current medical status, and diabetes risk factors.    Vitals:  There were no vitals taken for this visit.  Estimated body mass index is 26.4 kg/m  as calculated from the following:    Height as of 7/19/22: 1.88 m (6' 2\").    Weight as of 8/18/22: 93.3 kg (205 lb 9.6 oz).   Last 3 BP:   BP Readings from Last 3 Encounters:   08/18/22 120/80   08/04/22 122/88   02/10/22 118/86       History   Smoking Status     Never Smoker   Smokeless Tobacco     Never Used       Labs:  Lab Results   Component Value Date    A1C 10.5 08/04/2022     Lab Results   Component Value Date     08/04/2022     10/07/2020     Lab Results   Component Value Date    LDL  08/04/2022      Comment:      Cannot estimate LDL when triglyceride exceeds 400 mg/dL    LDL 75 08/04/2022     Direct Measure HDL   Date Value Ref Range Status   08/04/2022 28 (L) >=40 mg/dL Final   ]  GFR Estimate   Date Value Ref Range Status   08/04/2022 >90 >60 mL/min/1.73m2 Final     Comment:     Effective December 21, 2021 eGFRcr in adults is calculated using the 2021 CKD-EPI creatinine equation which includes age and gender (Deanne et al., NEJ, DOI: 10.1056/UIPAoz6074413)   01/31/2019 >90 >60 mL/min/[1.73_m2] Final     Comment:     Non  GFR Calc  Starting 12/18/2018, serum creatinine based estimated GFR (eGFR) will be   calculated using the Chronic Kidney Disease Epidemiology Collaboration   (CKD-EPI) equation.       GFR Estimate If Black   Date Value Ref Range Status   01/31/2019 >90 >60 mL/min/[1.73_m2] Final     Comment:      GFR Calc  Starting 12/18/2018, serum creatinine based estimated GFR (eGFR) will be   calculated using the Chronic Kidney Disease Epidemiology Collaboration   (CKD-EPI) equation.       Lab Results   Component Value Date    CR 0.80 08/04/2022    CR 0.84 01/31/2019     No results found for: MICROALBUMIN    Healthy Eating:  Healthy " Eating Assessed Today: Yes  Cultural/Bahai diet restrictions?: No  Meal planning/habits: Carb counting, Low carb, Plate planning method  Breakfast: keto yogurt with 1/2 cup blueberries,coffee with half and half or at home: two eggs or low carb bread with no sugar peanut butter  Lunch: salad with meat, vinegarette or wrap low carb  Dinner: chicken breast, asparagus and tomato or hamburger, cukes/tomatoes, ear of corn  Snacks: smoked almonds, string cheese, peanut butter and celery, apple  Beverages: Water    Being Active:  Exercise:: Yes (walking, bow hunting averaging 7,000 steps)  Days per week of moderate to strenuous exercise (like a brisk walk): 5  On average, minutes per day of exercise at this level: 60  Exercise Minutes per Week: 300  Barrier to exercise: Time    Monitoring:  Blood Glucose Meter: Accu-chek  Times checking blood sugar at home (number): 120-140 fasting am        Taking Medications:  Diabetes Medication(s)     Biguanides       metFORMIN (GLUCOPHAGE XR) 500 MG 24 hr tablet    Take 1 tablet (500 mg) by mouth daily (with dinner) for 14 days, THEN 1 tablet (500 mg) 2 times daily (with meals) for 14 days, THEN 2 tablets (1,000 mg) 2 times daily (with meals) for 62 days.          Current Treatments: Diet, Oral Medication (taken by mouth)    Problem Solving:   not assessed              Reducing Risks:  CAD Risks: Diabetes Mellitus, Family history    Healthy Coping:  Informal Support system:: Aura based, Family, Parent, Spouse  Patient Activation Measure Survey Score:  No flowsheet data found.      Care Plan and Education Provided:  Care Plan: Diabetes   Updates made by Marisa Pressley RD since 9/22/2022 12:00 AM      Problem: HbA1C Not In Goal       Goal: Establish Regular Follow-Ups with PCP       Task: Discuss with PCP the recommended timing for patient's next follow up visit(s)    Responsible User: Marisa Pressley RD      Task: Discuss schedule for PCP visits with patient    Responsible User: Wendie  ISABELLA Cunningham      Goal: Get HbA1C Level in Goal       Task: Educate patient on diabetes education self-management topics    Responsible User: Marisa Pressley RD      Task: Educate patient on benefits of regular glucose monitoring    Responsible User: Marisa Pressley RD      Task: Refer patient to appropriate extended care team member, as needed (Medication Therapy Management, Behavioral Health, Physical Therapy, etc.)    Responsible User: Marisa Pressley RD      Task: Discuss diabetes treatment plan with patient    Responsible User: Marisa Pressley RD      Problem: Diabetes Self-Management Education Needed to Optimize Self-Care Behaviors       Goal: Understand diabetes pathophysiology and disease progression       Task: Provide education on diabetes pathophysiology and disease progression specfic to patient's diabetes type    Responsible User: Marisa Pressley RD      Goal: Healthy Eating - follow a healthy eating pattern for diabetes       Task: Provide education on portion control and consistency in amount, composition and timing of food intake Completed 9/22/2022   Responsible User: Marisa Pressley RD      Task: Provide education on managing carbohydrate intake (carbohydrate counting, plate planning method, etc.) Completed 9/22/2022   Responsible User: Marisa Pressley RD      Task: Provide education on weight management    Responsible User: Marisa Pressley RD      Task: Provide education on heart healthy eating    Responsible User: Marisa Pressley RD      Task: Provide education on eating out    Responsible User: Marisa Pressley RD      Task: Develop individualized healthy eating plan with patient    Responsible User: Marisa Pressley RD      Goal: Being Active - get regular physical activity, working up to at least 150 minutes per week       Task: Provide education on relationship of activity to glucose and precautions to take if at risk for low glucose    Responsible User: Marisa Pressley RD      Task: Discuss barriers to physical activity with patient  Completed 9/22/2022   Responsible User: Marisa Pressley RD      Task: Develop physical activity plan with patient    Responsible User: Marisa Pressley RD      Task: Explore community resources including walking groups, assistance programs, and home videos    Responsible User: Marisa Pressley RD      Goal: Monitoring - monitor glucose and ketones as directed    Start Date: 9/20/2022   Expected End Date: 12/20/2022   Note:    Will wear sensor to get blood sugars.     Task: Provide education on blood glucose monitoring (purpose, proper technique, frequency, glucose targets, interpreting results, when to use glucose control solution, sharps disposal)    Responsible User: Marisa Pressley RD      Task: Provide education on continuous glucose monitoring (sensor placement, use of edvin or /reader, understanding glucose trends, alerts and alarms, differences between sensor glucose and blood glucose) Completed 9/22/2022   Responsible User: Marisa Pressley RD      Task: Provide education on ketone monitoring (when to monitor, frequency, etc.)    Responsible User: Marisa Pressley RD      Goal: Taking Medication - patient is consistently taking medications as directed       Task: Provide education on action of prescribed medication, including when to take and possible side effects Completed 9/22/2022   Responsible User: Marisa Pressley RD      Task: Provide education on insulin and injectable diabetes medications, including administration, storage, site selection and rotation for injection sites    Responsible User: Marisa Pressley RD      Task: Discuss barriers to medication adherence with patient and provide management technique ideas as appropriate    Responsible User: Marisa Pressley RD      Task: Provide education on frequency and refill details of medications    Responsible User: Marisa Pressley RD      Goal: Problem Solving - know how to prevent and manage short-term diabetes complications       Task: Provide education on high blood glucose -  causes, signs/symptoms, prevention and treatment    Responsible User: Marisa Pressley RD      Task: Provide education on low blood glucose - causes, signs/symptoms, prevention, treatment, carrying a carbohydrate source at all times, and medical identification    Responsible User: Marisa Pressley RD      Task: Provide education on safe travel with diabetes    Responsible User: Marisa Pressley RD      Task: Provide education on how to care for diabetes on sick days    Responsible User: Marisa Pressley RD      Task: Provide education on when to call a health care provider    Responsible User: Marisa Pressley RD      Goal: Reducing Risks - know how to prevent and treat long-term diabetes complications       Task: Provide education on major complications of diabetes, prevention, early diagnostic measures and treatment of complications    Responsible User: Marisa Pressley RD      Task: Provide education on recommended care for dental, eye and foot health    Responsible User: Marisa Pressley RD      Task: Provide education on Hemoglobin A1c - goals and relationship to blood glucose levels    Responsible User: Marisa Pressley RD      Task: Provide education on recommendations for heart health - lipid levels and goals, blood pressure and goals, and aspirin therapy, if indicated    Responsible User: Marisa Pressley RD      Task: Provide education on tobacco cessation    Responsible User: Marisa Pressley RD      Goal: Healthy Coping - use available resources to cope with the challenges of managing diabetes       Task: Discuss recognizing feelings about having diabetes    Responsible User: Marisa Pressley RD      Task: Provide education on the benefits of making appropriate lifestyle changes    Responsible User: Marisa Pressley RD      Task: Provide education on benefits of utilizing support systems    Responsible User: Marisa Pressley RD      Task: Discuss methods for coping with stress    Responsible User: Marisa Pressley RD      Task: Provide education on when to  seek professional counseling    Responsible User: Marisa Pressley RD              Time Spent: 60 minutes  Encounter Type: Individual    Any diabetes medication dose changes were made via the CDE Protocol per the patient's primary care provider. A copy of this encounter was shared with the provider.

## 2022-09-21 ENCOUNTER — TELEPHONE (OUTPATIENT)
Dept: FAMILY MEDICINE | Facility: CLINIC | Age: 47
End: 2022-09-21

## 2022-09-21 DIAGNOSIS — E11.65 TYPE 2 DIABETES MELLITUS WITH HYPERGLYCEMIA, WITHOUT LONG-TERM CURRENT USE OF INSULIN (H): Primary | ICD-10-CM

## 2022-09-21 NOTE — TELEPHONE ENCOUNTER
Prior Authorization Retail Medication Request    Medication/Dose: Freestyle 2 Simone Sensor  ICD code (if different than what is on RX):    Previously Tried and Failed:    Rationale: PRIOR AUTHORIZTION REQUIRED  FOR OVERRIDE HAVE MD CALL [phone]  PLAN LIMITATIONS EXCEEDED      Insurance Name:  [facility] [employer]  Insurance ID:  [id number]      Pharmacy Information (if different than what is on RX)  Name:    Phone:      Thank you,  Becky Pop [facility] Pharmacy Technician [place]

## 2022-09-22 NOTE — TELEPHONE ENCOUNTER
Central Prior Authorization Team   Phone: [phone]      PA Initiation    Medication: Freestyle 2 Simone Sensor--INITIATED  Insurance Company: [employer] - Phone [phone] Fax [phone]  Pharmacy Filling the Rx: [place], [place] - [address] [place] [address]  Filling Pharmacy Phone: [phone]  Filling Pharmacy Fax:    Start Date: 9/22/2022

## 2022-09-22 NOTE — TELEPHONE ENCOUNTER
Central Prior Authorization Team   Phone: [phone]      PRIOR AUTHORIZATION DENIED    Medication: Freestyle 2 Simone Sensor--DENIED    Denial Date: 9/22/2022    Denial Rational:        Appeal Information:

## 2022-09-23 NOTE — TELEPHONE ENCOUNTER
Pt calling in regards to this being denied. He is wondering if the Freestyle 1 Simone Sensor would be approved, wondering if this one should be requested. Pt is aware that Geraldo is out of office, returning on Monday. Okay to wait to hear back from Geraldo.    Ailyn Chung Patient

## 2022-09-26 RX ORDER — FLASH GLUCOSE SENSOR
KIT MISCELLANEOUS
Qty: 6 EACH | Refills: 4 | Status: SHIPPED | OUTPATIENT
Start: 2022-09-26 | End: 2024-05-01

## 2022-09-26 NOTE — TELEPHONE ENCOUNTER
Diabetes education contact:    Usually if they don't cover one sensor they don't cover the older version either.  I sent it in to see.  I did give pt the voucher code to call and he can get for $75 per month if not covered by insurance.    Marisa Pressley RD, [facility]

## 2022-09-27 NOTE — TELEPHONE ENCOUNTER
Diabetes education contact:    Alerted pt and he will call and use voucher code to get sensors.    Marisa Pressley RD,[facility]

## 2022-10-19 ENCOUNTER — IMMUNIZATION (OUTPATIENT)
Dept: FAMILY MEDICINE | Facility: CLINIC | Age: 47
End: 2022-10-19
Payer: COMMERCIAL

## 2022-10-19 PROCEDURE — 0124A COVID-19,PF,PFIZER BOOSTER BIVALENT: CPT

## 2022-10-19 PROCEDURE — 90686 IIV4 VACC NO PRSV 0.5 ML IM: CPT

## 2022-10-19 PROCEDURE — 90471 IMMUNIZATION ADMIN: CPT

## 2022-10-19 PROCEDURE — 91312 COVID-19,PF,PFIZER BOOSTER BIVALENT: CPT

## 2022-10-31 DIAGNOSIS — E11.65 TYPE 2 DIABETES MELLITUS WITH HYPERGLYCEMIA, WITHOUT LONG-TERM CURRENT USE OF INSULIN (H): ICD-10-CM

## 2022-10-31 RX ORDER — METFORMIN HCL 500 MG
1000 TABLET, EXTENDED RELEASE 24 HR ORAL 2 TIMES DAILY WITH MEALS
Qty: 360 TABLET | Refills: 0 | Status: SHIPPED | OUTPATIENT
Start: 2022-10-31 | End: 2023-02-15

## 2022-11-21 ENCOUNTER — HEALTH MAINTENANCE LETTER (OUTPATIENT)
Age: 47
End: 2022-11-21

## 2022-12-06 ENCOUNTER — LAB (OUTPATIENT)
Dept: LAB | Facility: CLINIC | Age: 47
End: 2022-12-06
Payer: COMMERCIAL

## 2022-12-06 ENCOUNTER — ALLIED HEALTH/NURSE VISIT (OUTPATIENT)
Dept: EDUCATION SERVICES | Facility: CLINIC | Age: 47
End: 2022-12-06
Payer: COMMERCIAL

## 2022-12-06 ENCOUNTER — TELEPHONE (OUTPATIENT)
Dept: FAMILY MEDICINE | Facility: CLINIC | Age: 47
End: 2022-12-06

## 2022-12-06 VITALS — WEIGHT: 208 LBS | BODY MASS INDEX: 26.71 KG/M2

## 2022-12-06 DIAGNOSIS — E11.65 TYPE 2 DIABETES MELLITUS WITH HYPERGLYCEMIA, WITHOUT LONG-TERM CURRENT USE OF INSULIN (H): ICD-10-CM

## 2022-12-06 DIAGNOSIS — E11.65 TYPE 2 DIABETES MELLITUS WITH HYPERGLYCEMIA, WITHOUT LONG-TERM CURRENT USE OF INSULIN (H): Primary | ICD-10-CM

## 2022-12-06 LAB — HBA1C MFR BLD: 6.5 % (ref 0–5.6)

## 2022-12-06 PROCEDURE — 36415 COLL VENOUS BLD VENIPUNCTURE: CPT

## 2022-12-06 PROCEDURE — 83036 HEMOGLOBIN GLYCOSYLATED A1C: CPT

## 2022-12-06 PROCEDURE — G0108 DIAB MANAGE TRN  PER INDIV: HCPCS | Performed by: DIETITIAN, REGISTERED

## 2022-12-06 RX ORDER — BLOOD-GLUCOSE SENSOR
1 EACH MISCELLANEOUS
Qty: 2 EACH | Refills: 11 | Status: SHIPPED | OUTPATIENT
Start: 2022-12-06 | End: 2023-12-20

## 2022-12-06 NOTE — TELEPHONE ENCOUNTER
Prior Authorization Retail Medication Request    Medication/Dose: Freestyle tami 3 sensor   ICD code (if different than what is on RX):    Previously Tried and Failed:    Rationale:  PRIOR AUTHORIZTION REQUIRED FOR OVERRIDE HAVE MD CALL [phone] PLAN LIMITATIONS EXCEEDED    Insurance Name:  [facility] [phone]  Insurance ID:  [id number]      Pharmacy Information (if different than what is on RX)  Name:    Phone:

## 2022-12-06 NOTE — PATIENT INSTRUCTIONS
Microalbumin yearly (urine test), eye exam yearly (Get dilated part), blood pressure goal 140/90 or less, dentist twice a year, lipids yearly(statin?), a1c under 7%, foot exam yearly.

## 2022-12-06 NOTE — PROGRESS NOTES
"Diabetes Self-Management Education & Support    Presents for: Individual review    Type of Service: In Person Visit    Assessment Type:   ASSESSMENT:  -doing well, watching food choices, average 150, 83% in range on sensor  -staying active.  Looking for adding in activity for winter now that hunting is done.  -went over complications of diabetes  Patient's most recent   Lab Results   Component Value Date    A1C 10.5 08/04/2022     is meeting goal of <7.0    Diabetes knowledge and skills assessment:   Patient is knowledgeable in diabetes management concepts related to: Healthy Eating, Being Active and Monitoring    Continue education with the following diabetes management concepts: Healthy Eating, Being Active, Monitoring, Taking Medication, Problem Solving, Reducing Risks and Healthy Coping    Based on learning assessment above, most appropriate setting for further diabetes education would be: Individual setting.      PLAN  Microalbumin yearly (urine test), eye exam yearly (Get dilated part), blood pressure goal 140/90 or less, dentist twice a year, lipids yearly(statin?), a1c under 7%, foot exam yearly.      Follow-up: in one year    See Care Plan for co-developed, patient-state behavior change goals.  AVS provided for patient today.    Education Materials Provided:  No new materials provided today      SUBJECTIVE/OBJECTIVE:  Presents for: Individual review  Diabetes type: Type 2  Cultural Influences/Ethnic Background:  Choose not to answer      Diabetes Symptoms & Complications:          Patient Problem List and Family Medical History reviewed for relevant medical history, current medical status, and diabetes risk factors.    Vitals:  Wt 94.3 kg (208 lb)   BMI 26.71 kg/m    Estimated body mass index is 26.71 kg/m  as calculated from the following:    Height as of 7/19/22: 1.88 m (6' 2\").    Weight as of this encounter: 94.3 kg (208 lb).   Last 3 BP:   BP Readings from Last 3 Encounters:   08/18/22 120/80 "   08/04/22 122/88   02/10/22 118/86       History   Smoking Status     Never   Smokeless Tobacco     Never       Labs:  Lab Results   Component Value Date    A1C 10.5 08/04/2022     Lab Results   Component Value Date     08/04/2022     10/07/2020     Lab Results   Component Value Date    LDL  08/04/2022      Comment:      Cannot estimate LDL when triglyceride exceeds 400 mg/dL    LDL 75 08/04/2022     Direct Measure HDL   Date Value Ref Range Status   08/04/2022 28 (L) >=40 mg/dL Final   ]  GFR Estimate   Date Value Ref Range Status   08/04/2022 >90 >60 mL/min/1.73m2 Final     Comment:     Effective December 21, 2021 eGFRcr in adults is calculated using the 2021 CKD-EPI creatinine equation which includes age and gender (Deanne et al., NEJM, DOI: 10.1056/KGBQea5758770)   01/31/2019 >90 >60 mL/min/[1.73_m2] Final     Comment:     Non  GFR Calc  Starting 12/18/2018, serum creatinine based estimated GFR (eGFR) will be   calculated using the Chronic Kidney Disease Epidemiology Collaboration   (CKD-EPI) equation.       GFR Estimate If Black   Date Value Ref Range Status   01/31/2019 >90 >60 mL/min/[1.73_m2] Final     Comment:      GFR Calc  Starting 12/18/2018, serum creatinine based estimated GFR (eGFR) will be   calculated using the Chronic Kidney Disease Epidemiology Collaboration   (CKD-EPI) equation.       Lab Results   Component Value Date    CR 0.80 08/04/2022    CR 0.84 01/31/2019     No results found for: MICROALBUMIN    Healthy Eating:  Healthy Eating Assessed Today: Yes  Ratio keto yogurt with berries or eggs/meat  Lunch: salad with meat 3 times week or venison sticks, string cheese and nuts  Supper: coconut chicken and broccoli  Snacks: usually smoked almonds, celery peanut butter, venison stick  Being Active:   was hunting, had treadmill and was walking during the lunch hour.    Monitoring:               Taking Medications:  Diabetes Medication(s)     Biguanides        metFORMIN (GLUCOPHAGE XR) 500 MG 24 hr tablet    Take 2 tablets (1,000 mg) by mouth 2 times daily (with meals)                    Reducing Risks:   went over complications    Healthy Coping:     Patient Activation Measure Survey Score:  No flowsheet data found.      Care Plan and Education Provided:  Care Plan: Diabetes   Updates made by Marisa Pressley RD since 12/6/2022 12:00 AM      Problem: Diabetes Self-Management Education Needed to Optimize Self-Care Behaviors       Goal: Reducing Risks - know how to prevent and treat long-term diabetes complications       Task: Provide education on major complications of diabetes, prevention, early diagnostic measures and treatment of complications Completed 12/6/2022   Responsible User: Marisa Pressley RD      Task: Provide education on recommended care for dental, eye and foot health Completed 12/6/2022   Responsible User: Marisa Pressley RD      Task: Provide education on Hemoglobin A1c - goals and relationship to blood glucose levels Completed 12/6/2022   Responsible User: Marisa Pressley RD      Task: Provide education on recommendations for heart health - lipid levels and goals, blood pressure and goals, and aspirin therapy, if indicated Completed 12/6/2022   Responsible User: Marisa Pressley RD              Time Spent: 45 minutes  Encounter Type: Individual    Any diabetes medication dose changes were made via the CDE Protocol per the patient's primary care provider. A copy of this encounter was shared with the provider.

## 2022-12-07 NOTE — TELEPHONE ENCOUNTER
No pa needed- The quantity requested is within the plan limitation and does not need a pa. The pharmacy will need to call insurance for an override. However, the pharmacy has already received a paid using a discount card.

## 2022-12-07 NOTE — TELEPHONE ENCOUNTER
Central Prior Authorization Team  Phone: [phone]    PA Initiation    Medication: Freestyle tami 3 sensor   Insurance Company: [employer] - Phone [phone] Fax [phone]  Pharmacy Filling the Rx: [facility] [place], [place] - [address] [place] [address]  Filling Pharmacy Phone: [phone]  Filling Pharmacy Fax:    Start Date: 12/7/2022       Statement Selected

## 2023-01-02 ENCOUNTER — TRANSFERRED RECORDS (OUTPATIENT)
Dept: MULTI SPECIALTY CLINIC | Facility: CLINIC | Age: 48
End: 2023-01-02

## 2023-01-02 LAB — RETINOPATHY: NORMAL

## 2023-04-16 ENCOUNTER — HEALTH MAINTENANCE LETTER (OUTPATIENT)
Age: 48
End: 2023-04-16

## 2023-05-15 ENCOUNTER — OFFICE VISIT (OUTPATIENT)
Dept: FAMILY MEDICINE | Facility: CLINIC | Age: 48
End: 2023-05-15
Payer: COMMERCIAL

## 2023-05-15 VITALS
HEIGHT: 74 IN | SYSTOLIC BLOOD PRESSURE: 118 MMHG | OXYGEN SATURATION: 99 % | WEIGHT: 206.4 LBS | RESPIRATION RATE: 16 BRPM | TEMPERATURE: 96.7 F | HEART RATE: 88 BPM | DIASTOLIC BLOOD PRESSURE: 84 MMHG | BODY MASS INDEX: 26.49 KG/M2

## 2023-05-15 DIAGNOSIS — E11.65 TYPE 2 DIABETES MELLITUS WITH HYPERGLYCEMIA, WITHOUT LONG-TERM CURRENT USE OF INSULIN (H): Primary | ICD-10-CM

## 2023-05-15 DIAGNOSIS — Z12.11 SCREEN FOR COLON CANCER: ICD-10-CM

## 2023-05-15 DIAGNOSIS — E78.5 HYPERLIPIDEMIA LDL GOAL <70: ICD-10-CM

## 2023-05-15 LAB
CHOLEST SERPL-MCNC: 163 MG/DL
HBA1C MFR BLD: 6.5 % (ref 0–5.6)
HDLC SERPL-MCNC: 31 MG/DL
HOLD SPECIMEN: NORMAL
LDLC SERPL CALC-MCNC: 82 MG/DL
NONHDLC SERPL-MCNC: 132 MG/DL
TRIGL SERPL-MCNC: 250 MG/DL

## 2023-05-15 PROCEDURE — 36415 COLL VENOUS BLD VENIPUNCTURE: CPT | Performed by: PHYSICIAN ASSISTANT

## 2023-05-15 PROCEDURE — 80061 LIPID PANEL: CPT | Performed by: PHYSICIAN ASSISTANT

## 2023-05-15 PROCEDURE — 83036 HEMOGLOBIN GLYCOSYLATED A1C: CPT | Performed by: PHYSICIAN ASSISTANT

## 2023-05-15 PROCEDURE — 99214 OFFICE O/P EST MOD 30 MIN: CPT | Performed by: PHYSICIAN ASSISTANT

## 2023-05-15 RX ORDER — ROSUVASTATIN CALCIUM 5 MG/1
5 TABLET, COATED ORAL DAILY
Qty: 90 TABLET | Refills: 3 | Status: SHIPPED | OUTPATIENT
Start: 2023-05-15 | End: 2024-05-01

## 2023-05-15 RX ORDER — METFORMIN HCL 500 MG
1000 TABLET, EXTENDED RELEASE 24 HR ORAL 2 TIMES DAILY WITH MEALS
Qty: 360 TABLET | Refills: 3 | Status: SHIPPED | OUTPATIENT
Start: 2023-05-15 | End: 2024-04-24

## 2023-05-15 ASSESSMENT — PAIN SCALES - GENERAL: PAINLEVEL: NO PAIN (0)

## 2023-05-15 NOTE — PROGRESS NOTES
"Assessment & Plan   Type 2 diabetes mellitus with hyperglycemia, without long-term current use of insulin (H)  A1c is 6.5% again and well controlled. Continue Metformin and lifestyle changes. ASCVD is 9.5% and patient elected to start Crestor today. Recheck lipid panel.   - metFORMIN (GLUCOPHAGE XR) 500 MG 24 hr tablet; Take 2 tablets (1,000 mg) by mouth 2 times daily (with meals) Due for diabetic follow up before further fills.  - Lipid panel reflex to direct LDL Fasting; Future  - CT Coronary Calcium Scan; Future    Hyperlipidemia LDL goal <70  Start Crestor for elevated ASCVD risk as above. Pt also interested in Coronary Calcium Artery score due to elevated risk.   - rosuvastatin (CRESTOR) 5 MG tablet; Take 1 tablet (5 mg) by mouth daily  - CT Coronary Calcium Scan; Future    Screen for colon cancer  Due for screening.   - Colonoscopy Screening  Referral; Future    BMI:   Estimated body mass index is 26.5 kg/m  as calculated from the following:    Height as of this encounter: 1.88 m (6' 2\").    Weight as of this encounter: 93.6 kg (206 lb 6.4 oz).     REYNALDO [facility]    Roopa Monroy is a 48 year old, presenting for the following health issues:  Diabetes        5/15/2023     7:02 AM   Additional Questions   Roomed by Anay NOLASCO CMA     History of Present Illness       Diabetes:   He presents for follow up of diabetes.  He is checking home blood glucose with a continuous glucose monitor.  He checks blood glucose before and after meals.  Blood glucose is sometimes over 200 and never under 70. When his blood glucose is low, the patient is asymptomatic for confusion, blurred vision, lethargy and reports not feeling dizzy, shaky, or weak.  He has no concerns regarding his diabetes at this time.  He is not experiencing numbness or burning in feet, excessive thirst, blurry vision, weight changes or redness, sores or blisters on feet.         He eats 4 or more " "servings of fruits and vegetables daily.He consumes 0 sweetened beverage(s) daily.He exercises with enough effort to increase his heart rate 30 to 60 minutes per day.  He exercises with enough effort to increase his heart rate 5 days per week.   He is taking medications regularly.     Review of Systems   See HPI       Objective    /84 (BP Location: Right arm, Patient Position: Sitting, Cuff Size: Adult Regular)   Pulse 88   Temp (!) 96.7  F (35.9  C) (Tympanic)   Resp 16   Ht 1.88 m (6' 2\")   Wt 93.6 kg (206 lb 6.4 oz)   SpO2 99%   BMI 26.50 kg/m    Body mass index is 26.5 kg/m .  Physical Exam   Constitutional: healthy, alert, and no distress  Head: Normocephalic. Atraumatic  Eyes: No conjunctival injection, sclera anicteric  Neck: supple, no thyromegaly, nodules or asymmetry of the thyroid. No cervical LAD.  Cardiovascular: RRR. No murmurs, clicks, gallops, or rubs. No peripheral edema.   Respiratory: No resp distress. Lungs CTAB bilaterally.   Musculoskeletal: extremities normal- no gross deformities noted, and normal muscle tone  Skin: no suspicious lesions or rashes  Neurologic: Gait normal. CN 2-12 grossly intact  Psychiatric: mentation appears normal and affect normal/bright     Results for orders placed or performed in visit on 05/15/23   HEMOGLOBIN A1C     Status: Abnormal   Result Value Ref Range    Hemoglobin A1C 6.5 (H) 0.0 - 5.6 %   Extra Tube     Status: None (In process)    Narrative    The following orders were created for panel order Extra Tube.  Procedure                               Abnormality         Status                     ---------                               -----------         ------                     Extra Red Top Tube[[id number]]                               In process                   Please view results for these tests on the individual orders.                "

## 2023-05-15 NOTE — PATIENT INSTRUCTIONS
Start Crestor.     Continue Metformin.     Schedule colonoscopy and CT Coronary Artery Scan.     Follow-up in 6 months for recheck.

## 2023-05-17 ENCOUNTER — ANCILLARY ORDERS (OUTPATIENT)
Dept: FAMILY MEDICINE | Facility: CLINIC | Age: 48
End: 2023-05-17

## 2023-05-17 DIAGNOSIS — E11.65 TYPE 2 DIABETES MELLITUS WITH HYPERGLYCEMIA, WITHOUT LONG-TERM CURRENT USE OF INSULIN (H): ICD-10-CM

## 2023-05-17 DIAGNOSIS — E78.5 HYPERLIPIDEMIA LDL GOAL <70: ICD-10-CM

## 2023-05-24 ENCOUNTER — HOSPITAL ENCOUNTER (OUTPATIENT)
Dept: CT IMAGING | Facility: CLINIC | Age: 48
Discharge: HOME | End: 2023-05-24
Attending: PHYSICIAN ASSISTANT | Admitting: PHYSICIAN ASSISTANT
Payer: COMMERCIAL

## 2023-05-24 DIAGNOSIS — E78.5 HYPERLIPIDEMIA LDL GOAL <70: ICD-10-CM

## 2023-05-24 DIAGNOSIS — E11.65 TYPE 2 DIABETES MELLITUS WITH HYPERGLYCEMIA, WITHOUT LONG-TERM CURRENT USE OF INSULIN (H): ICD-10-CM

## 2023-05-24 PROCEDURE — 75571 CT HRT W/O DYE W/CA TEST: CPT | Mod: 26 | Performed by: INTERNAL MEDICINE

## 2023-05-24 PROCEDURE — 75571 CT HRT W/O DYE W/CA TEST: CPT

## 2023-08-21 ENCOUNTER — MYC MEDICAL ADVICE (OUTPATIENT)
Dept: FAMILY MEDICINE | Facility: CLINIC | Age: 48
End: 2023-08-21
Payer: COMMERCIAL

## 2023-08-21 DIAGNOSIS — Z12.11 SPECIAL SCREENING FOR MALIGNANT NEOPLASMS, COLON: Primary | ICD-10-CM

## 2023-09-17 ENCOUNTER — HEALTH MAINTENANCE LETTER (OUTPATIENT)
Age: 48
End: 2023-09-17

## 2023-10-23 ENCOUNTER — ANESTHESIA EVENT (OUTPATIENT)
Dept: GASTROENTEROLOGY | Facility: CLINIC | Age: 48
End: 2023-10-23
Payer: COMMERCIAL

## 2023-10-23 RX ORDER — HYDROMORPHONE HCL IN WATER/PF 6 MG/30 ML
0.4 PATIENT CONTROLLED ANALGESIA SYRINGE INTRAVENOUS EVERY 5 MIN PRN
Status: CANCELLED | OUTPATIENT
Start: 2023-10-23

## 2023-10-23 RX ORDER — ONDANSETRON 2 MG/ML
4 INJECTION INTRAMUSCULAR; INTRAVENOUS EVERY 30 MIN PRN
Status: CANCELLED | OUTPATIENT
Start: 2023-10-23

## 2023-10-23 RX ORDER — FENTANYL CITRATE 50 UG/ML
25 INJECTION, SOLUTION INTRAMUSCULAR; INTRAVENOUS EVERY 5 MIN PRN
Status: CANCELLED | OUTPATIENT
Start: 2023-10-23

## 2023-10-23 RX ORDER — SODIUM CHLORIDE, SODIUM LACTATE, POTASSIUM CHLORIDE, CALCIUM CHLORIDE 600; 310; 30; 20 MG/100ML; MG/100ML; MG/100ML; MG/100ML
INJECTION, SOLUTION INTRAVENOUS CONTINUOUS
Status: CANCELLED | OUTPATIENT
Start: 2023-10-23

## 2023-10-23 RX ORDER — FENTANYL CITRATE 50 UG/ML
25 INJECTION, SOLUTION INTRAMUSCULAR; INTRAVENOUS
Status: CANCELLED | OUTPATIENT
Start: 2023-10-23

## 2023-10-23 RX ORDER — FENTANYL CITRATE 50 UG/ML
50 INJECTION, SOLUTION INTRAMUSCULAR; INTRAVENOUS EVERY 5 MIN PRN
Status: CANCELLED | OUTPATIENT
Start: 2023-10-23

## 2023-10-23 RX ORDER — ONDANSETRON 4 MG/1
4 TABLET, ORALLY DISINTEGRATING ORAL EVERY 30 MIN PRN
Status: CANCELLED | OUTPATIENT
Start: 2023-10-23

## 2023-10-23 RX ORDER — HYDROMORPHONE HCL IN WATER/PF 6 MG/30 ML
0.2 PATIENT CONTROLLED ANALGESIA SYRINGE INTRAVENOUS EVERY 5 MIN PRN
Status: CANCELLED | OUTPATIENT
Start: 2023-10-23

## 2023-10-23 RX ORDER — OXYCODONE HYDROCHLORIDE 5 MG/1
10 TABLET ORAL
Status: CANCELLED | OUTPATIENT
Start: 2023-10-23

## 2023-10-23 RX ORDER — OXYCODONE HYDROCHLORIDE 5 MG/1
5 TABLET ORAL
Status: CANCELLED | OUTPATIENT
Start: 2023-10-23

## 2023-10-23 NOTE — ANESTHESIA PREPROCEDURE EVALUATION
Anesthesia Pre-Procedure Evaluation    Patient: Torey Mata   MRN: [id number] : 1975        Procedure : Procedure(s):  Colonoscopy          No past medical history on file.   Past Surgical History:   Procedure Laterality Date     HYDROCELECTOMY SCROTAL Right 2019    Procedure: Excision of right epididymal cyst;  Surgeon: Jake Griffin MD;  Location: [facility] OR      Allergies   Allergen Reactions     Amoxicillin Rash      Social History     Tobacco Use     Smoking status: Never     Smokeless tobacco: Never   Substance Use Topics     Alcohol use: No      Wt Readings from Last 1 Encounters:   05/15/23 93.6 kg (206 lb 6.4 oz)        Anesthesia Evaluation   Pt has had prior anesthetic. Type: General.        ROS/MED HX  ENT/Pulmonary:  - neg pulmonary ROS     Neurologic:  - neg neurologic ROS     Cardiovascular:     (+) Dyslipidemia - -   -  - -                                      METS/Exercise Tolerance:     Hematologic:  - neg hematologic  ROS     Musculoskeletal:  - neg musculoskeletal ROS     GI/Hepatic:  - neg GI/hepatic ROS     Renal/Genitourinary:  - neg Renal ROS     Endo:     (+)  type II DM,                    Psychiatric/Substance Use:  - neg psychiatric ROS     Infectious Disease:  - neg infectious disease ROS     Malignancy:       Other:  - neg other ROS          Physical Exam    Airway        Mallampati: I   TM distance: > 3 FB   Neck ROM: full   Mouth opening: > 3 cm    Respiratory Devices and Support         Dental           Cardiovascular   cardiovascular exam normal          Pulmonary   pulmonary exam normal            OUTSIDE LABS:  CBC:   Lab Results   Component Value Date    WBC 6.2 2022    WBC 7.3 2019    HGB 17.1 2022    HGB 15.6 2019    HCT 46.8 2022    HCT 43.6 2019     2022     2019     BMP:   Lab Results   Component Value Date     (L) 2022    POTASSIUM 4.5 2022    CHLORIDE 98 2022    CO2  "27 08/04/2022    BUN 15 08/04/2022    CR 0.80 08/04/2022    CR 0.84 01/31/2019     (H) 08/04/2022     (H) 10/07/2020     COAGS: No results found for: \"PTT\", \"INR\", \"FIBR\"  POC: No results found for: \"BGM\", \"HCG\", \"HCGS\"  HEPATIC:   Lab Results   Component Value Date    ALBUMIN 3.9 08/04/2022    PROTTOTAL 7.4 08/04/2022    ALT 69 08/04/2022    AST 26 08/04/2022    ALKPHOS 75 08/04/2022    BILITOTAL 0.8 08/04/2022     OTHER:   Lab Results   Component Value Date    A1C 6.5 (H) 05/15/2023    ERIC 8.9 08/04/2022       Anesthesia Plan    ASA Status:  2       Anesthesia Type: General.   Induction: Propofol.   Maintenance: TIVA.        Consents    Anesthesia Plan(s) and associated risks, benefits, and realistic alternatives discussed. Questions answered and patient/representative(s) expressed understanding.     - Discussed: Risks, Benefits and Alternatives for BOTH SEDATION and the PROCEDURE were discussed     - Discussed with:  Patient            Postoperative Care    Pain management: IV analgesics, Oral pain medications, Multi-modal analgesia.   PONV prophylaxis: Ondansetron (or other 5HT-3), Dexamethasone or Solumedrol     Comments:              Margarita Valente CRNA, TEOFILO PURI  "

## 2023-10-25 ENCOUNTER — HOSPITAL ENCOUNTER (OUTPATIENT)
Facility: CLINIC | Age: 48
Discharge: HOME | End: 2023-10-25
Attending: SURGERY | Admitting: SURGERY
Payer: COMMERCIAL

## 2023-10-25 ENCOUNTER — ANESTHESIA (OUTPATIENT)
Dept: GASTROENTEROLOGY | Facility: CLINIC | Age: 48
End: 2023-10-25
Payer: COMMERCIAL

## 2023-10-25 VITALS
SYSTOLIC BLOOD PRESSURE: 138 MMHG | OXYGEN SATURATION: 94 % | RESPIRATION RATE: 17 BRPM | WEIGHT: 197 LBS | HEIGHT: 74 IN | HEART RATE: 80 BPM | TEMPERATURE: 98.2 F | BODY MASS INDEX: 25.28 KG/M2 | DIASTOLIC BLOOD PRESSURE: 101 MMHG

## 2023-10-25 LAB
COLONOSCOPY: NORMAL
GLUCOSE BLDC GLUCOMTR-MCNC: 171 MG/DL (ref 70–99)

## 2023-10-25 PROCEDURE — 250N000009 HC RX 250: Performed by: SURGERY

## 2023-10-25 PROCEDURE — 258N000003 HC RX IP 258 OP 636: Performed by: SURGERY

## 2023-10-25 PROCEDURE — 88305 TISSUE EXAM BY PATHOLOGIST: CPT | Mod: 26 | Performed by: PATHOLOGY

## 2023-10-25 PROCEDURE — 88305 TISSUE EXAM BY PATHOLOGIST: CPT | Mod: TC | Performed by: SURGERY

## 2023-10-25 PROCEDURE — 250N000009 HC RX 250: Performed by: NURSE ANESTHETIST, CERTIFIED REGISTERED

## 2023-10-25 PROCEDURE — 82962 GLUCOSE BLOOD TEST: CPT

## 2023-10-25 PROCEDURE — 45385 COLONOSCOPY W/LESION REMOVAL: CPT | Performed by: SURGERY

## 2023-10-25 PROCEDURE — 370N000017 HC ANESTHESIA TECHNICAL FEE, PER MIN: Performed by: SURGERY

## 2023-10-25 PROCEDURE — 250N000011 HC RX IP 250 OP 636: Performed by: NURSE ANESTHETIST, CERTIFIED REGISTERED

## 2023-10-25 RX ORDER — FLUMAZENIL 0.1 MG/ML
0.2 INJECTION, SOLUTION INTRAVENOUS
Status: CANCELLED | OUTPATIENT
Start: 2023-10-25 | End: 2023-10-25

## 2023-10-25 RX ORDER — LIDOCAINE HYDROCHLORIDE 20 MG/ML
INJECTION, SOLUTION INFILTRATION; PERINEURAL PRN
Status: DISCONTINUED | OUTPATIENT
Start: 2023-10-25 | End: 2023-10-25

## 2023-10-25 RX ORDER — NALOXONE HYDROCHLORIDE 0.4 MG/ML
0.2 INJECTION, SOLUTION INTRAMUSCULAR; INTRAVENOUS; SUBCUTANEOUS
Status: CANCELLED | OUTPATIENT
Start: 2023-10-25

## 2023-10-25 RX ORDER — LIDOCAINE 40 MG/G
CREAM TOPICAL
Status: DISCONTINUED | OUTPATIENT
Start: 2023-10-25 | End: 2023-10-25 | Stop reason: HOSPADM

## 2023-10-25 RX ORDER — PROPOFOL 10 MG/ML
INJECTION, EMULSION INTRAVENOUS CONTINUOUS PRN
Status: DISCONTINUED | OUTPATIENT
Start: 2023-10-25 | End: 2023-10-25

## 2023-10-25 RX ORDER — NALOXONE HYDROCHLORIDE 0.4 MG/ML
0.4 INJECTION, SOLUTION INTRAMUSCULAR; INTRAVENOUS; SUBCUTANEOUS
Status: CANCELLED | OUTPATIENT
Start: 2023-10-25

## 2023-10-25 RX ORDER — ONDANSETRON 2 MG/ML
4 INJECTION INTRAMUSCULAR; INTRAVENOUS
Status: DISCONTINUED | OUTPATIENT
Start: 2023-10-25 | End: 2023-10-25 | Stop reason: HOSPADM

## 2023-10-25 RX ORDER — GLYCOPYRROLATE 0.2 MG/ML
INJECTION, SOLUTION INTRAMUSCULAR; INTRAVENOUS PRN
Status: DISCONTINUED | OUTPATIENT
Start: 2023-10-25 | End: 2023-10-25

## 2023-10-25 RX ORDER — PROPOFOL 10 MG/ML
INJECTION, EMULSION INTRAVENOUS PRN
Status: DISCONTINUED | OUTPATIENT
Start: 2023-10-25 | End: 2023-10-25

## 2023-10-25 RX ORDER — SODIUM CHLORIDE, SODIUM LACTATE, POTASSIUM CHLORIDE, CALCIUM CHLORIDE 600; 310; 30; 20 MG/100ML; MG/100ML; MG/100ML; MG/100ML
INJECTION, SOLUTION INTRAVENOUS CONTINUOUS
Status: DISCONTINUED | OUTPATIENT
Start: 2023-10-25 | End: 2023-10-25 | Stop reason: HOSPADM

## 2023-10-25 RX ADMIN — LIDOCAINE HYDROCHLORIDE 0.1 ML: 10 INJECTION, SOLUTION EPIDURAL; INFILTRATION; INTRACAUDAL; PERINEURAL at 07:30

## 2023-10-25 RX ADMIN — LIDOCAINE HYDROCHLORIDE 100 MG: 20 INJECTION, SOLUTION INFILTRATION; PERINEURAL at 08:05

## 2023-10-25 RX ADMIN — GLYCOPYRROLATE 0.1 MG: 0.2 INJECTION, SOLUTION INTRAMUSCULAR; INTRAVENOUS at 08:05

## 2023-10-25 RX ADMIN — PROPOFOL 200 MCG/KG/MIN: 10 INJECTION, EMULSION INTRAVENOUS at 08:05

## 2023-10-25 RX ADMIN — SODIUM CHLORIDE, POTASSIUM CHLORIDE, SODIUM LACTATE AND CALCIUM CHLORIDE: 600; 310; 30; 20 INJECTION, SOLUTION INTRAVENOUS at 07:30

## 2023-10-25 RX ADMIN — PROPOFOL 100 MG: 10 INJECTION, EMULSION INTRAVENOUS at 08:05

## 2023-10-25 ASSESSMENT — ACTIVITIES OF DAILY LIVING (ADL): ADLS_ACUITY_SCORE: 33

## 2023-10-25 NOTE — ANESTHESIA POSTPROCEDURE EVALUATION
Patient: Torey Mata    Procedure: Procedure(s):  COLONOSCOPY, FLEXIBLE, WITH LESION REMOVAL USING SNARE       Anesthesia Type:  General    Note:  Disposition: Outpatient   Postop Pain Control: Uneventful            Sign Out: Well controlled pain   PONV: No   Neuro/Psych: Uneventful            Sign Out: Acceptable/Baseline neuro status   Airway/Respiratory: Uneventful            Sign Out: Acceptable/Baseline resp. status   CV/Hemodynamics: Uneventful            Sign Out: Acceptable CV status; No obvious hypovolemia; No obvious fluid overload   Other NRE: NONE   DID A NON-ROUTINE EVENT OCCUR? No           Last vitals:  Vitals Value Taken Time   /91 10/25/23 0829   Temp 36.8  C (98.2  F) 10/25/23 0829   Pulse 88 10/25/23 0829   Resp 17 10/25/23 0829   SpO2 92 % 10/25/23 0830   Vitals shown include unfiled device data.    Electronically Signed By: TEOFILO Lerner CRNA  October 25, 2023  8:31 AM

## 2023-10-25 NOTE — H&P
[facility]    Pre-Endoscopy History and Physical     Torey Mata MRN# [id number]   YOB: 1975 Age: 48 year old     Date of Procedure: 10/25/2023  Primary care provider: Shade Hartmann [facility]  Type of Endoscopy: Colonoscopy with possible biopsy, possible polypectomy  Reason for Procedure: Screening  Type of Anesthesia Anticipated: Conscious Sedation    HPI:    Torey is a 48 year old male who will be undergoing the above procedure.  Father has a history of polyps.  Denies change in stool caliber or blood in stool.  This is his first colonoscopy.    A history and physical has been performed. The patient's medications and allergies have been reviewed. The risks and benefits of the procedure and the sedation options and risks were discussed with the patient.  All questions were answered and informed consent was obtained.      He denies a personal or family history of anesthesia complications or bleeding disorders.     Patient Active Problem List   Diagnosis    Tinea versicolor    Mass of right hand    Abscess of right hand    Diabetes mellitus, type 2 (H)    Hyperlipidemia LDL goal <70        No past medical history on file.     Past Surgical History:   Procedure Laterality Date    HYDROCELECTOMY SCROTAL Right 2/7/2019    Procedure: Excision of right epididymal cyst;  Surgeon: Jake Griffin MD;  Location: [place] OR       Social History     Tobacco Use    Smoking status: Never    Smokeless tobacco: Never   Substance Use Topics    Alcohol use: No       Family History   Problem Relation Age of Onset    Diabetes Father         Using insulin for control.    Hayden's esophagus Father     Heart Disease Father     Myocardial Infarction Maternal Grandfather     Esophageal Cancer Paternal Grandfather        Prior to Admission medications    Medication Sig Start Date End Date Taking? Authorizing Provider   blood glucose (NO BRAND SPECIFIED) test strip Use to test  "blood sugar 1 times daily or as directed. To accompany: Blood Glucose Monitor Brands: per insurance. 8/4/22  Yes Braulio Zee PA-C   blood glucose monitoring (NO BRAND SPECIFIED) meter device kit Use to test blood sugar 1 times daily or as directed. Preferred blood glucose meter OR supplies to accompany: Blood Glucose Monitor Brands: per insurance. 8/4/22  Yes Braulio Zee PA-C   Continuous Blood Gluc Sensor (FREESTYLE LOREN 14 DAY SENSOR) MISC Change every 14 days. 9/26/22  Yes Braulio Zee PA-C   Continuous Blood Gluc Sensor (FREESTYLE LOREN 3 SENSOR) MISC 1 each every 14 days 12/6/22  Yes Braulio Zee PA-C   metFORMIN (GLUCOPHAGE XR) 500 MG 24 hr tablet Take 2 tablets (1,000 mg) by mouth 2 times daily (with meals) Due for diabetic follow up before further fills. 5/15/23  Yes Braulio Zee PA-C   rosuvastatin (CRESTOR) 5 MG tablet Take 1 tablet (5 mg) by mouth daily 5/15/23  Yes Braulio Zee PA-C   thin (NO BRAND SPECIFIED) lancets Use to test blood sugar 1 times daily or as directed. To accompany: Blood Glucose Monitor Brands: per insurance. 8/4/22  Yes Braulio Zee PA-C   alcohol swab prep pads Use to swab area of injection/la nena as directed. 8/4/22   Braulio Zee PA-C       Allergies   Allergen Reactions    Amoxicillin Rash        REVIEW OF SYSTEMS:   5 point ROS negative except as noted above in HPI, including Gen., Resp., CV, GI &  system review.    PHYSICAL EXAM:   BP (!) 128/99 (BP Location: Right arm)   Pulse 81   Temp 98.2  F (36.8  C) (Oral)   Ht 1.88 m (6' 2\")   Wt 89.4 kg (197 lb)   SpO2 98%   BMI 25.29 kg/m   Estimated body mass index is 25.29 kg/m  as calculated from the following:    Height as of this encounter: 1.88 m (6' 2\").    Weight as of this encounter: 89.4 kg (197 lb).   Constitutional: Awake, alert, no acute distress.  Eyes: No scleral icterus.  Conjunctiva are without injection.  ENMT: Mucous membranes moist, dentition and gums are " intact.   Neck: Soft, supple, trachea midline.    Endocrine: n/a   Lymphatic: There is no cervical, submandibularadenopathy.  Respiratory: normal effortgs   Cardiovascular: S1, S2  Abdomen: Non-distended, non-tender,  No masses,  Musculoskeletal: No spinal or CVA tenderness. Full range of motion in the upper and lower extremities.    Skin: No skin rashes or lesions to inspection.  No petechia.    Neurologic: alerted and oriented 3x  Psychiatric: The patient's affect is not blunted and mood is appropriate.  DIAGNOSTICS:    Not indicated    IMPRESSION   ASA Class 2 - Mild systemic disease    PLAN:   Plan for Colonoscopy with possible biopsy, possible polypectomy. We discussed the risks, benefits and alternatives and the patient wished to proceed.  Patient is cleared for the above procedure.    The above has been forwarded to the consulting provider.    Braulio Monroe DO  [facility]

## 2023-10-25 NOTE — ANESTHESIA CARE TRANSFER NOTE
Patient: Torey Mata    Procedure: Procedure(s):  COLONOSCOPY, FLEXIBLE, WITH LESION REMOVAL USING SNARE       Diagnosis: Special screening for malignant neoplasm of colon [Z12.11]  Diagnosis Additional Information: No value filed.    Anesthesia Type:   General     Note:    Oropharynx: oropharynx clear of all foreign objects  Level of Consciousness: awake  Oxygen Supplementation: room air    Independent Airway: airway patency satisfactory and stable  Dentition: dentition unchanged  Vital Signs Stable: post-procedure vital signs reviewed and stable  Report to RN Given: handoff report given  Patient transferred to: Phase II    Handoff Report: Identifed the Patient, Identified the Reponsible Provider, Reviewed the pertinent medical history, Discussed the surgical course, Reviewed Intra-OP anesthesia mangement and issues during anesthesia, Set expectations for post-procedure period and Allowed opportunity for questions and acknowledgement of understanding      Vitals:  Vitals Value Taken Time   /91 10/25/23 0826   Temp     Pulse 88 10/25/23 0826   Resp     SpO2 97 % 10/25/23 0827   Vitals shown include unfiled device data.    Electronically Signed By: TEOFILO Lerner CRNA  October 25, 2023  8:29 AM

## 2023-10-25 NOTE — LETTER
Torey Mata  [address] [facility] [id number]      November 2, 2023    Dear Torey,  This letter is written to inform you of the results of your recent colonoscopy.  Your examination showed polyp(s) in your transverse colon and rectum. All polyps were removed in their entirety and sent for review by a pathologist. As you will see on the pathology report below, the tissue(s) were hyperplastic polyps and polyps consistent with sessile serrated adenoma. Your examination also showed Diverticulosis and hemorrhoids.    Final Diagnosis   A: Large intestine, transverse, polypectomy:  -Serrated adenoma     B: Large intestine, rectum, polypectomy:  -Hyperplastic polyp     Diverticulosis can be described as small outpouchings (pockets) in your colon wall. This is an entirely benign (non-cancerous) finding.  Hyperplastic polyps are entirely benign (non-cancerous) and rarely associated with the development of additional polyps or colorectal cancer.  Adenomatous polyps are entirely benign (non-cancerous); however, patients who have developed these polyps are at an increased risk for developing additional polyps in the future. If these are not eventually removed, there is a risk of developing colon cancer. In particular, sessile serrated adenomas are associated with increased risk of malignant transformation. We will advise more frequent examinations with you because of the risk associated with this type of polyp.    Given these findings, your personal history of polyps, I recommend that you undergo a repeat colonoscopy in 5 year(s) for surveillance. We will enter you into a recall system so you receive a reminder closer to the time that you are due for repeat examination.     Please remember that this recommendation is made with the understanding that you are not experiencing persistent changes in bowel function, bleeding per rectum, and/or significant abdominal pain. If you experience these symptoms, please  contact your primary care provider for a further evaluation.     If you have any questions or concerns about the results of your colonoscopy or the appropriate follow-up, please contact my assistant at [phone]    Sincerely,      Braulio Monroe,    [facility]  ___

## 2023-10-26 LAB
PATH REPORT.COMMENTS IMP SPEC: NORMAL
PATH REPORT.COMMENTS IMP SPEC: NORMAL
PATH REPORT.FINAL DX SPEC: NORMAL
PATH REPORT.GROSS SPEC: NORMAL
PATH REPORT.MICROSCOPIC SPEC OTHER STN: NORMAL
PATH REPORT.RELEVANT HX SPEC: NORMAL
PHOTO IMAGE: NORMAL

## 2023-11-26 ENCOUNTER — HEALTH MAINTENANCE LETTER (OUTPATIENT)
Age: 48
End: 2023-11-26

## 2023-12-16 ENCOUNTER — TELEPHONE (OUTPATIENT)
Dept: FAMILY MEDICINE | Facility: CLINIC | Age: 48
End: 2023-12-16
Payer: COMMERCIAL

## 2023-12-16 DIAGNOSIS — E11.65 TYPE 2 DIABETES MELLITUS WITH HYPERGLYCEMIA, WITHOUT LONG-TERM CURRENT USE OF INSULIN (H): Primary | ICD-10-CM

## 2023-12-16 DIAGNOSIS — E11.65 TYPE 2 DIABETES MELLITUS WITH HYPERGLYCEMIA, WITHOUT LONG-TERM CURRENT USE OF INSULIN (H): ICD-10-CM

## 2023-12-16 NOTE — TELEPHONE ENCOUNTER
Torey is requesting a new Rx for his Freestyle Simone 3 Sensors. Rx  earlier this month, he was traveling and now has been without for almost a week      Thank You,  [facility] Velez [facility]

## 2023-12-18 ENCOUNTER — MYC REFILL (OUTPATIENT)
Dept: EDUCATION SERVICES | Facility: CLINIC | Age: 48
End: 2023-12-18
Payer: COMMERCIAL

## 2023-12-18 DIAGNOSIS — E11.65 TYPE 2 DIABETES MELLITUS WITH HYPERGLYCEMIA, WITHOUT LONG-TERM CURRENT USE OF INSULIN (H): ICD-10-CM

## 2023-12-18 RX ORDER — BLOOD-GLUCOSE SENSOR
EACH MISCELLANEOUS
Refills: 11 | OUTPATIENT
Start: 2023-12-18

## 2023-12-18 RX ORDER — BLOOD-GLUCOSE SENSOR
1 EACH MISCELLANEOUS
Qty: 2 EACH | Refills: 11 | OUTPATIENT
Start: 2023-12-18

## 2023-12-20 ENCOUNTER — ALLIED HEALTH/NURSE VISIT (OUTPATIENT)
Dept: FAMILY MEDICINE | Facility: CLINIC | Age: 48
End: 2023-12-20
Payer: COMMERCIAL

## 2023-12-20 DIAGNOSIS — E11.65 TYPE 2 DIABETES MELLITUS WITH HYPERGLYCEMIA, WITHOUT LONG-TERM CURRENT USE OF INSULIN (H): ICD-10-CM

## 2023-12-20 PROCEDURE — 99207 PR NO CHARGE NURSE ONLY: CPT

## 2023-12-20 RX ORDER — BLOOD-GLUCOSE SENSOR
1 EACH MISCELLANEOUS
Qty: 2 EACH | Refills: 5 | Status: SHIPPED | OUTPATIENT
Start: 2023-12-20 | End: 2024-06-06

## 2023-12-20 NOTE — PROGRESS NOTES
Patient walked into the clinic today needing a refill of his Free Style Simone 3 sensor as he is all out and most recent RX that was sent over to the pharmacy was incorrect it was for the Free Style Simone 2.    Advised patient he is due for A1C and diabetic follow up, patient verbalized good understanding.     Lab Results   Component Value Date    A1C 6.5 05/15/2023    A1C 6.5 12/06/2022    A1C 10.5 08/04/2022     Prescription approved per [facility] Refill Protocol.  Julie Behrendt RN

## 2024-01-10 ENCOUNTER — OFFICE VISIT (OUTPATIENT)
Dept: FAMILY MEDICINE | Facility: CLINIC | Age: 49
End: 2024-01-10
Attending: PHYSICIAN ASSISTANT
Payer: COMMERCIAL

## 2024-01-10 VITALS
RESPIRATION RATE: 18 BRPM | BODY MASS INDEX: 27.34 KG/M2 | DIASTOLIC BLOOD PRESSURE: 78 MMHG | SYSTOLIC BLOOD PRESSURE: 130 MMHG | OXYGEN SATURATION: 97 % | HEART RATE: 80 BPM | TEMPERATURE: 98.6 F | HEIGHT: 74 IN | WEIGHT: 213 LBS

## 2024-01-10 DIAGNOSIS — E78.5 HYPERLIPIDEMIA LDL GOAL <70: ICD-10-CM

## 2024-01-10 DIAGNOSIS — E11.65 TYPE 2 DIABETES MELLITUS WITH HYPERGLYCEMIA, WITHOUT LONG-TERM CURRENT USE OF INSULIN (H): Primary | ICD-10-CM

## 2024-01-10 LAB
ALBUMIN SERPL BCG-MCNC: 4.7 G/DL (ref 3.5–5.2)
ALP SERPL-CCNC: 55 U/L (ref 40–150)
ALT SERPL W P-5'-P-CCNC: 80 U/L (ref 0–70)
ANION GAP SERPL CALCULATED.3IONS-SCNC: 12 MMOL/L (ref 7–15)
AST SERPL W P-5'-P-CCNC: 35 U/L (ref 0–45)
BILIRUB SERPL-MCNC: 0.7 MG/DL
BUN SERPL-MCNC: 19.7 MG/DL (ref 6–20)
CALCIUM SERPL-MCNC: 9.7 MG/DL (ref 8.6–10)
CHLORIDE SERPL-SCNC: 99 MMOL/L (ref 98–107)
CHOLEST SERPL-MCNC: 167 MG/DL
CREAT SERPL-MCNC: 0.84 MG/DL (ref 0.67–1.17)
CREAT UR-MCNC: 149.6 MG/DL
DEPRECATED HCO3 PLAS-SCNC: 25 MMOL/L (ref 22–29)
EGFRCR SERPLBLD CKD-EPI 2021: >90 ML/MIN/1.73M2
FASTING STATUS PATIENT QL REPORTED: YES
GLUCOSE SERPL-MCNC: 199 MG/DL (ref 70–99)
HBA1C MFR BLD: 7.4 % (ref 0–5.6)
HDLC SERPL-MCNC: 32 MG/DL
HOLD SPECIMEN: NORMAL
HOLD SPECIMEN: NORMAL
LDLC SERPL CALC-MCNC: 77 MG/DL
MICROALBUMIN UR-MCNC: 15.5 MG/L
MICROALBUMIN/CREAT UR: 10.36 MG/G CR (ref 0–17)
NONHDLC SERPL-MCNC: 135 MG/DL
POTASSIUM SERPL-SCNC: 4.6 MMOL/L (ref 3.4–5.3)
PROT SERPL-MCNC: 7.3 G/DL (ref 6.4–8.3)
SODIUM SERPL-SCNC: 136 MMOL/L (ref 135–145)
TRIGL SERPL-MCNC: 290 MG/DL

## 2024-01-10 PROCEDURE — 36415 COLL VENOUS BLD VENIPUNCTURE: CPT | Performed by: PHYSICIAN ASSISTANT

## 2024-01-10 PROCEDURE — 83036 HEMOGLOBIN GLYCOSYLATED A1C: CPT | Performed by: PHYSICIAN ASSISTANT

## 2024-01-10 PROCEDURE — 99214 OFFICE O/P EST MOD 30 MIN: CPT | Performed by: PHYSICIAN ASSISTANT

## 2024-01-10 PROCEDURE — 82570 ASSAY OF URINE CREATININE: CPT | Performed by: PHYSICIAN ASSISTANT

## 2024-01-10 PROCEDURE — 82043 UR ALBUMIN QUANTITATIVE: CPT | Performed by: PHYSICIAN ASSISTANT

## 2024-01-10 PROCEDURE — 80061 LIPID PANEL: CPT | Performed by: PHYSICIAN ASSISTANT

## 2024-01-10 PROCEDURE — 80053 COMPREHEN METABOLIC PANEL: CPT | Performed by: PHYSICIAN ASSISTANT

## 2024-01-10 RX ORDER — ROSUVASTATIN CALCIUM 5 MG/1
5 TABLET, COATED ORAL DAILY
Qty: 90 TABLET | Refills: 3 | Status: CANCELLED | OUTPATIENT
Start: 2024-01-10

## 2024-01-10 RX ORDER — TIRZEPATIDE 5 MG/.5ML
5 INJECTION, SOLUTION SUBCUTANEOUS
Qty: 2 ML | Refills: 0 | Status: SHIPPED | OUTPATIENT
Start: 2024-02-07 | End: 2024-03-06

## 2024-01-10 RX ORDER — TIRZEPATIDE 7.5 MG/.5ML
7.5 INJECTION, SOLUTION SUBCUTANEOUS
Qty: 6 ML | Refills: 3 | Status: SHIPPED | OUTPATIENT
Start: 2024-03-06

## 2024-01-10 RX ORDER — METFORMIN HCL 500 MG
1000 TABLET, EXTENDED RELEASE 24 HR ORAL 2 TIMES DAILY WITH MEALS
Qty: 360 TABLET | Refills: 3 | Status: CANCELLED | OUTPATIENT
Start: 2024-01-10

## 2024-01-10 RX ORDER — TIRZEPATIDE 2.5 MG/.5ML
2.5 INJECTION, SOLUTION SUBCUTANEOUS
Qty: 2 ML | Refills: 0 | Status: SHIPPED | OUTPATIENT
Start: 2024-01-10 | End: 2024-02-07

## 2024-01-10 ASSESSMENT — PAIN SCALES - GENERAL: PAINLEVEL: NO PAIN (0)

## 2024-03-04 ENCOUNTER — MYC REFILL (OUTPATIENT)
Dept: FAMILY MEDICINE | Facility: CLINIC | Age: 49
End: 2024-03-04
Payer: COMMERCIAL

## 2024-03-04 DIAGNOSIS — E11.65 TYPE 2 DIABETES MELLITUS WITH HYPERGLYCEMIA, WITHOUT LONG-TERM CURRENT USE OF INSULIN (H): ICD-10-CM

## 2024-03-04 RX ORDER — TIRZEPATIDE 5 MG/.5ML
5 INJECTION, SOLUTION SUBCUTANEOUS
Qty: 2 ML | Refills: 0 | OUTPATIENT
Start: 2024-03-04

## 2024-03-04 RX ORDER — TIRZEPATIDE 5 MG/.5ML
INJECTION, SOLUTION SUBCUTANEOUS
Qty: 2 ML | Refills: 0 | OUTPATIENT
Start: 2024-03-04

## 2024-04-14 ENCOUNTER — HEALTH MAINTENANCE LETTER (OUTPATIENT)
Age: 49
End: 2024-04-14

## 2024-04-24 DIAGNOSIS — E11.65 TYPE 2 DIABETES MELLITUS WITH HYPERGLYCEMIA, WITHOUT LONG-TERM CURRENT USE OF INSULIN (H): ICD-10-CM

## 2024-04-24 RX ORDER — METFORMIN HCL 500 MG
500 TABLET, EXTENDED RELEASE 24 HR ORAL 2 TIMES DAILY WITH MEALS
Qty: 360 TABLET | Refills: 0 | Status: SHIPPED | OUTPATIENT
Start: 2024-04-24 | End: 2024-05-01

## 2024-05-01 ENCOUNTER — OFFICE VISIT (OUTPATIENT)
Dept: FAMILY MEDICINE | Facility: CLINIC | Age: 49
End: 2024-05-01
Payer: COMMERCIAL

## 2024-05-01 VITALS
HEART RATE: 96 BPM | SYSTOLIC BLOOD PRESSURE: 118 MMHG | DIASTOLIC BLOOD PRESSURE: 82 MMHG | TEMPERATURE: 96.9 F | BODY MASS INDEX: 23.92 KG/M2 | WEIGHT: 186.4 LBS | OXYGEN SATURATION: 98 % | RESPIRATION RATE: 18 BRPM | HEIGHT: 74 IN

## 2024-05-01 DIAGNOSIS — E11.65 TYPE 2 DIABETES MELLITUS WITH HYPERGLYCEMIA, WITHOUT LONG-TERM CURRENT USE OF INSULIN (H): Primary | ICD-10-CM

## 2024-05-01 DIAGNOSIS — E78.5 HYPERLIPIDEMIA LDL GOAL <70: ICD-10-CM

## 2024-05-01 LAB — HBA1C MFR BLD: 5.6 % (ref 0–5.6)

## 2024-05-01 PROCEDURE — 90471 IMMUNIZATION ADMIN: CPT | Performed by: PHYSICIAN ASSISTANT

## 2024-05-01 PROCEDURE — 83036 HEMOGLOBIN GLYCOSYLATED A1C: CPT | Performed by: PHYSICIAN ASSISTANT

## 2024-05-01 PROCEDURE — 36415 COLL VENOUS BLD VENIPUNCTURE: CPT | Performed by: PHYSICIAN ASSISTANT

## 2024-05-01 PROCEDURE — 90677 PCV20 VACCINE IM: CPT | Performed by: PHYSICIAN ASSISTANT

## 2024-05-01 PROCEDURE — 99213 OFFICE O/P EST LOW 20 MIN: CPT | Mod: 25 | Performed by: PHYSICIAN ASSISTANT

## 2024-05-01 RX ORDER — ROSUVASTATIN CALCIUM 5 MG/1
5 TABLET, COATED ORAL DAILY
Qty: 90 TABLET | Refills: 3 | Status: SHIPPED | OUTPATIENT
Start: 2024-05-01

## 2024-05-01 RX ORDER — FLASH GLUCOSE SENSOR
KIT MISCELLANEOUS
Qty: 6 EACH | Refills: 4 | Status: SHIPPED | OUTPATIENT
Start: 2024-05-01

## 2024-05-01 ASSESSMENT — PAIN SCALES - GENERAL: PAINLEVEL: NO PAIN (0)

## 2024-05-01 NOTE — PROGRESS NOTES
Assessment & Plan   Type 2 diabetes mellitus with hyperglycemia, without long-term current use of insulin (H)  A1c is well controlled at 5.6% with just Mounjaro. Stopped Metformin due to GI side effects. Tolerating Mounjaro well on its own now. PCV20 updated today. Follow-up with me in 6-12 months for recheck.   - HEMOGLOBIN A1C; Future  - Continuous Glucose Sensor (FREESTYLE LOREN 14 DAY SENSOR) MISC; Change every 14 days.    The longitudinal plan of care for the diagnosis(es)/condition(s) as documented were addressed during this visit. Due to the added complexity in care, I will continue to support Porfirio in the subsequent management and with ongoing continuity of care.     Roopa Monroy is a 49 year old, presenting for the following health issues:  Diabetes        5/1/2024    10:12 AM   Additional Questions   Roomed by Anay NOLASCO CMA       History of Present Illness       Diabetes:   He presents for follow up of diabetes.   He is checking home blood glucose with a continuous glucose monitor.   He checks blood glucose after meals, before and after meals and at bedtime.  Blood glucose is never over 200 and sometimes under 70. He is aware of hypoglycemia symptoms including shakiness.   He is concerned about low blood sugar, several less than 70 in the past few weeks.    He is not experiencing numbness or burning in feet, excessive thirst, blurry vision, weight changes or redness, sores or blisters on feet. The patient has not had a diabetic eye exam in the last 12 months.          He eats 4 or more servings of fruits and vegetables daily.He consumes 0 sweetened beverage(s) daily.He exercises with enough effort to increase his heart rate 30 to 60 minutes per day.  He exercises with enough effort to increase his heart rate 5 days per week.   He is taking medications regularly.       Review of Systems  See HPI       Objective    /82 (BP Location: Right arm, Patient Position: Sitting, Cuff Size: Adult Regular)    "Pulse 96   Temp 96.9  F (36.1  C) (Tympanic)   Resp 18   Ht 1.88 m (6' 2\")   Wt 84.6 kg (186 lb 6.4 oz)   SpO2 98%   BMI 23.93 kg/m    Body mass index is 23.93 kg/m .  Physical Exam   Constitutional: healthy, alert, and no distress  Head: Normocephalic. Atraumatic  Eyes: No conjunctival injection, sclera anicteric  Respiratory: No resp distress.  Musculoskeletal: extremities normal- no gross deformities noted, and normal muscle tone  Neurologic: Gait normal. CN 2-12 grossly intact  Psychiatric: mentation appears normal and affect normal/bright  MS: no gross musculoskeletal defects noted, no edema  Diabetic foot exam: normal DP and PT pulses, no trophic changes or ulcerative lesions, normal sensory exam, normal monofilament exam, tinea pedis, and onychomycosis    Results for orders placed or performed in visit on 05/01/24   HEMOGLOBIN A1C     Status: Normal   Result Value Ref Range    Hemoglobin A1C 5.6 0.0 - 5.6 %            Signed Electronically by: Braulio Zee PA-C    "

## 2024-06-06 ENCOUNTER — MYC REFILL (OUTPATIENT)
Dept: FAMILY MEDICINE | Facility: CLINIC | Age: 49
End: 2024-06-06
Payer: COMMERCIAL

## 2024-06-06 DIAGNOSIS — E11.65 TYPE 2 DIABETES MELLITUS WITH HYPERGLYCEMIA, WITHOUT LONG-TERM CURRENT USE OF INSULIN (H): ICD-10-CM

## 2024-06-06 RX ORDER — BLOOD-GLUCOSE SENSOR
EACH MISCELLANEOUS
Refills: 5 | OUTPATIENT
Start: 2024-06-06

## 2024-06-06 NOTE — TELEPHONE ENCOUNTER
Pending Prescriptions:                       Disp   Refills    Continuous Glucose Sensor (FREESTYLE LIBR*2 each 5            Si each every 14 days    Routing refill request to provider for review/approval because:  Drug not on the [facility] refill protocol       Breezy Morton RN

## 2024-06-07 RX ORDER — BLOOD-GLUCOSE SENSOR
1 EACH MISCELLANEOUS
Qty: 2 EACH | Refills: 11 | Status: SHIPPED | OUTPATIENT
Start: 2024-06-07

## 2024-09-01 ENCOUNTER — HEALTH MAINTENANCE LETTER (OUTPATIENT)
Age: 49
End: 2024-09-01

## 2024-11-06 ENCOUNTER — TELEPHONE (OUTPATIENT)
Dept: FAMILY MEDICINE | Facility: CLINIC | Age: 49
End: 2024-11-06
Payer: COMMERCIAL

## 2024-11-06 NOTE — TELEPHONE ENCOUNTER
Prior Authorization Retail Medication Request    Medication/Dose: Mounjaro 7.5MG/0.5ML auto-injectors  Diagnosis and ICD code (if different than what is on RX):    New/renewal/insurance change PA/secondary ins. PA:  Previously Tried and Failed:    Rationale:      Covermymeds  Key: [id number]    Pharmacy Information (if different than what is on RX)  Name:    Phone:    Fax:    Clinic Information  Preferred routing pool for dept communication: [id number]

## 2024-11-07 NOTE — TELEPHONE ENCOUNTER
Prior Authorization Approval    Medication: MOUNJARO 7.5 MG/0.5ML SC SOAJ  Authorization Effective Date: 10/8/2024  Authorization Expiration Date: 11/7/2025  Approved Dose/Quantity: 2ml/28ds  Reference #: [id number]   Insurance Company: [employer] [phone] Fax [phone]  Expected CoPay: $    CoPay Card Available:      Financial Assistance Needed: NA  Which Pharmacy is filling the prescription: [facility], [place] - [address] [place] [address]  Pharmacy Notified: Yes  Patient Notified: Yes, via [employer]      PA Initiation    Medication: MOUNJARO 7.5 MG/0.5ML SC SOAJ  Insurance Company: [employer] [phone] Fax [phone]  Pharmacy Filling the Rx: [facility], [place] - [address] [place] [address]  Filling Pharmacy Phone: [phone]  Start Date: 11/7/2024

## 2024-11-12 ENCOUNTER — TELEPHONE (OUTPATIENT)
Dept: FAMILY MEDICINE | Facility: CLINIC | Age: 49
End: 2024-11-12

## 2024-11-12 ENCOUNTER — IMMUNIZATION (OUTPATIENT)
Dept: FAMILY MEDICINE | Facility: CLINIC | Age: 49
End: 2024-11-12
Payer: COMMERCIAL

## 2024-11-12 DIAGNOSIS — Z23 NEED FOR PROPHYLACTIC VACCINATION AND INOCULATION AGAINST INFLUENZA: Primary | ICD-10-CM

## 2024-11-12 DIAGNOSIS — E11.65 TYPE 2 DIABETES MELLITUS WITH HYPERGLYCEMIA, WITHOUT LONG-TERM CURRENT USE OF INSULIN (H): Primary | ICD-10-CM

## 2024-11-12 PROCEDURE — 90656 IIV3 VACC NO PRSV 0.5 ML IM: CPT

## 2024-11-12 PROCEDURE — 90471 IMMUNIZATION ADMIN: CPT

## 2024-11-12 PROCEDURE — 90480 ADMN SARSCOV2 VAC 1/ONLY CMP: CPT

## 2024-11-12 PROCEDURE — 91320 SARSCV2 VAC 30MCG TRS-SUC IM: CPT

## 2024-11-12 PROCEDURE — 99207 PR NO CHARGE NURSE ONLY: CPT

## 2024-11-12 NOTE — TELEPHONE ENCOUNTER
Simone 3 sensors are on backorder again with no release date. Simone 3 Plus sensors are compatible with Simone 3 receivers. Can we have a new Rx please?      Thank You,  Merlene Velez [facility]

## 2024-11-13 RX ORDER — HYDROCHLOROTHIAZIDE 12.5 MG/1
CAPSULE ORAL
Qty: 6 EACH | Refills: 0 | Status: SHIPPED | OUTPATIENT
Start: 2024-11-13

## 2024-11-13 NOTE — TELEPHONE ENCOUNTER
Prescription approved per [facility] Refill Protocol. Needs clinic appointment for further refills.   Julie Behrendt RN

## 2025-01-04 ENCOUNTER — HEALTH MAINTENANCE LETTER (OUTPATIENT)
Age: 50
End: 2025-01-04

## 2025-02-11 DIAGNOSIS — E11.65 TYPE 2 DIABETES MELLITUS WITH HYPERGLYCEMIA, WITHOUT LONG-TERM CURRENT USE OF INSULIN (H): ICD-10-CM

## 2025-02-13 RX ORDER — HYDROCHLOROTHIAZIDE 12.5 MG/1
CAPSULE ORAL
Qty: 6 EACH | Refills: 3 | Status: SHIPPED | OUTPATIENT
Start: 2025-02-13

## 2025-02-13 RX ORDER — TIRZEPATIDE 7.5 MG/.5ML
INJECTION, SOLUTION SUBCUTANEOUS
Qty: 2 ML | Refills: 0 | Status: SHIPPED | OUTPATIENT
Start: 2025-02-13

## 2025-02-21 PROBLEM — L02.511 ABSCESS OF RIGHT HAND: Status: RESOLVED | Noted: 2022-07-19 | Resolved: 2025-02-21

## 2025-02-21 PROBLEM — R22.31 MASS OF RIGHT HAND: Status: RESOLVED | Noted: 2022-07-19 | Resolved: 2025-02-21

## 2025-03-07 ENCOUNTER — TRANSFERRED RECORDS (OUTPATIENT)
Dept: HEALTH INFORMATION MANAGEMENT | Facility: CLINIC | Age: 50
End: 2025-03-07
Payer: COMMERCIAL

## 2025-05-31 ENCOUNTER — HEALTH MAINTENANCE LETTER (OUTPATIENT)
Age: 50
End: 2025-05-31

## 2025-07-22 ENCOUNTER — PATIENT OUTREACH (OUTPATIENT)
Dept: CARE COORDINATION | Facility: CLINIC | Age: 50
End: 2025-07-22
Payer: COMMERCIAL

## (undated) DEVICE — LABEL MEDICATION SYSTEM  3304

## (undated) DEVICE — GOWN IMPERVIOUS SPECIALTY XLG/XLONG 32474

## (undated) DEVICE — STOCKING SLEEVE COMPRESSION CALF MED

## (undated) DEVICE — SPONGE RAY-TEC 4X8" 7318

## (undated) DEVICE — SU VICRYL 3-0 SH 27" UND J416H

## (undated) DEVICE — PACK LAPAROTOMY CUSTOM LAKES

## (undated) DEVICE — Device

## (undated) DEVICE — BLADE KNIFE SURG 15 371115

## (undated) DEVICE — ENDO SNARE EXACTO COLD 9MM LOOP 2.4MMX230CM 00711115

## (undated) DEVICE — PREP SKIN SCRUB TRAY 4461A

## (undated) DEVICE — SOL NACL 0.9% IRRIG 1000ML BOTTLE 07138-09

## (undated) DEVICE — DRSG KERLIX FLUFFS X5

## (undated) DEVICE — SUCTION TIP YANKAUER STR K87

## (undated) DEVICE — SYR BULB IRRIG DOVER 60 ML LATEX FREE 67000

## (undated) DEVICE — BLADE CLIPPER 4406

## (undated) DEVICE — GLOVE PROTEXIS W/NEU-THERA 7.5  2D73TE75

## (undated) DEVICE — SUPPORTER ATHLETIC LG LATEX 202636

## (undated) RX ORDER — ONDANSETRON 2 MG/ML
INJECTION INTRAMUSCULAR; INTRAVENOUS
Status: DISPENSED
Start: 2019-02-07

## (undated) RX ORDER — FENTANYL CITRATE 50 UG/ML
INJECTION, SOLUTION INTRAMUSCULAR; INTRAVENOUS
Status: DISPENSED
Start: 2019-02-07

## (undated) RX ORDER — HYDROMORPHONE HYDROCHLORIDE 1 MG/ML
INJECTION, SOLUTION INTRAMUSCULAR; INTRAVENOUS; SUBCUTANEOUS
Status: DISPENSED
Start: 2019-02-07

## (undated) RX ORDER — PROPOFOL 10 MG/ML
INJECTION, EMULSION INTRAVENOUS
Status: DISPENSED
Start: 2019-02-07

## (undated) RX ORDER — CLINDAMYCIN PHOSPHATE 900 MG/50ML
INJECTION, SOLUTION INTRAVENOUS
Status: DISPENSED
Start: 2019-02-07

## (undated) RX ORDER — DEXAMETHASONE SODIUM PHOSPHATE 4 MG/ML
INJECTION, SOLUTION INTRA-ARTICULAR; INTRALESIONAL; INTRAMUSCULAR; INTRAVENOUS; SOFT TISSUE
Status: DISPENSED
Start: 2019-02-07

## (undated) RX ORDER — LIDOCAINE HYDROCHLORIDE 10 MG/ML
INJECTION, SOLUTION EPIDURAL; INFILTRATION; INTRACAUDAL; PERINEURAL
Status: DISPENSED
Start: 2019-02-07

## (undated) RX ORDER — BUPIVACAINE HYDROCHLORIDE 2.5 MG/ML
INJECTION, SOLUTION INFILTRATION; PERINEURAL
Status: DISPENSED
Start: 2019-02-07